# Patient Record
Sex: MALE | Race: WHITE | NOT HISPANIC OR LATINO | URBAN - METROPOLITAN AREA
[De-identification: names, ages, dates, MRNs, and addresses within clinical notes are randomized per-mention and may not be internally consistent; named-entity substitution may affect disease eponyms.]

---

## 2018-01-01 ENCOUNTER — INPATIENT (INPATIENT)
Facility: HOSPITAL | Age: 83
LOS: 4 days | End: 2018-11-03
Attending: HOSPITALIST | Admitting: HOSPITALIST

## 2018-01-01 VITALS — SYSTOLIC BLOOD PRESSURE: 59 MMHG | RESPIRATION RATE: 14 BRPM | DIASTOLIC BLOOD PRESSURE: 30 MMHG | HEART RATE: 100 BPM

## 2018-01-01 VITALS
RESPIRATION RATE: 18 BRPM | OXYGEN SATURATION: 96 % | HEART RATE: 118 BPM | SYSTOLIC BLOOD PRESSURE: 115 MMHG | DIASTOLIC BLOOD PRESSURE: 68 MMHG | TEMPERATURE: 98 F

## 2018-01-01 DIAGNOSIS — R00.0 TACHYCARDIA, UNSPECIFIED: ICD-10-CM

## 2018-01-01 DIAGNOSIS — R64 CACHEXIA: ICD-10-CM

## 2018-01-01 DIAGNOSIS — Z87.891 PERSONAL HISTORY OF NICOTINE DEPENDENCE: ICD-10-CM

## 2018-01-01 DIAGNOSIS — E87.5 HYPERKALEMIA: ICD-10-CM

## 2018-01-01 DIAGNOSIS — N17.9 ACUTE KIDNEY FAILURE, UNSPECIFIED: ICD-10-CM

## 2018-01-01 DIAGNOSIS — Z79.84 LONG TERM (CURRENT) USE OF ORAL HYPOGLYCEMIC DRUGS: ICD-10-CM

## 2018-01-01 DIAGNOSIS — C78.6 SECONDARY MALIGNANT NEOPLASM OF RETROPERITONEUM AND PERITONEUM: ICD-10-CM

## 2018-01-01 DIAGNOSIS — Z82.49 FAMILY HISTORY OF ISCHEMIC HEART DISEASE AND OTHER DISEASES OF THE CIRCULATORY SYSTEM: ICD-10-CM

## 2018-01-01 DIAGNOSIS — I12.9 HYPERTENSIVE CHRONIC KIDNEY DISEASE WITH STAGE 1 THROUGH STAGE 4 CHRONIC KIDNEY DISEASE, OR UNSPECIFIED CHRONIC KIDNEY DISEASE: ICD-10-CM

## 2018-01-01 DIAGNOSIS — E78.5 HYPERLIPIDEMIA, UNSPECIFIED: ICD-10-CM

## 2018-01-01 DIAGNOSIS — E87.2 ACIDOSIS: ICD-10-CM

## 2018-01-01 DIAGNOSIS — E11.22 TYPE 2 DIABETES MELLITUS WITH DIABETIC CHRONIC KIDNEY DISEASE: ICD-10-CM

## 2018-01-01 DIAGNOSIS — K74.60 UNSPECIFIED CIRRHOSIS OF LIVER: ICD-10-CM

## 2018-01-01 DIAGNOSIS — N18.3 CHRONIC KIDNEY DISEASE, STAGE 3 (MODERATE): ICD-10-CM

## 2018-01-01 DIAGNOSIS — Z98.890 OTHER SPECIFIED POSTPROCEDURAL STATES: Chronic | ICD-10-CM

## 2018-01-01 DIAGNOSIS — I95.81 POSTPROCEDURAL HYPOTENSION: ICD-10-CM

## 2018-01-01 DIAGNOSIS — Z51.5 ENCOUNTER FOR PALLIATIVE CARE: ICD-10-CM

## 2018-01-01 DIAGNOSIS — C80.1 MALIGNANT (PRIMARY) NEOPLASM, UNSPECIFIED: ICD-10-CM

## 2018-01-01 DIAGNOSIS — H40.9 UNSPECIFIED GLAUCOMA: ICD-10-CM

## 2018-01-01 DIAGNOSIS — R91.1 SOLITARY PULMONARY NODULE: ICD-10-CM

## 2018-01-01 DIAGNOSIS — K76.9 LIVER DISEASE, UNSPECIFIED: ICD-10-CM

## 2018-01-01 DIAGNOSIS — R18.8 OTHER ASCITES: ICD-10-CM

## 2018-01-01 DIAGNOSIS — R53.1 WEAKNESS: ICD-10-CM

## 2018-01-01 DIAGNOSIS — R19.7 DIARRHEA, UNSPECIFIED: ICD-10-CM

## 2018-01-01 DIAGNOSIS — Z83.3 FAMILY HISTORY OF DIABETES MELLITUS: ICD-10-CM

## 2018-01-01 DIAGNOSIS — Z66 DO NOT RESUSCITATE: ICD-10-CM

## 2018-01-01 DIAGNOSIS — N40.0 BENIGN PROSTATIC HYPERPLASIA WITHOUT LOWER URINARY TRACT SYMPTOMS: ICD-10-CM

## 2018-01-01 LAB
ALBUMIN FLD-MCNC: 1.6 G/DL — SIGNIFICANT CHANGE UP
ALBUMIN SERPL ELPH-MCNC: 2.2 G/DL — LOW (ref 3.5–5.2)
ALBUMIN SERPL ELPH-MCNC: 2.3 G/DL — LOW (ref 3.5–5.2)
ALBUMIN SERPL ELPH-MCNC: 2.3 G/DL — LOW (ref 3.5–5.2)
ALBUMIN SERPL ELPH-MCNC: 2.4 G/DL — LOW (ref 3.5–5.2)
ALP SERPL-CCNC: 84 U/L — SIGNIFICANT CHANGE UP (ref 30–115)
ALP SERPL-CCNC: 88 U/L — SIGNIFICANT CHANGE UP (ref 30–115)
ALP SERPL-CCNC: 90 U/L — SIGNIFICANT CHANGE UP (ref 30–115)
ALP SERPL-CCNC: 96 U/L — SIGNIFICANT CHANGE UP (ref 30–115)
ALT FLD-CCNC: 24 U/L — SIGNIFICANT CHANGE UP (ref 0–41)
ALT FLD-CCNC: 27 U/L — SIGNIFICANT CHANGE UP (ref 0–41)
ALT FLD-CCNC: 28 U/L — SIGNIFICANT CHANGE UP (ref 0–41)
ALT FLD-CCNC: 29 U/L — SIGNIFICANT CHANGE UP (ref 0–41)
ANION GAP SERPL CALC-SCNC: 19 MMOL/L — HIGH (ref 7–14)
ANION GAP SERPL CALC-SCNC: 20 MMOL/L — HIGH (ref 7–14)
ANION GAP SERPL CALC-SCNC: 21 MMOL/L — HIGH (ref 7–14)
ANION GAP SERPL CALC-SCNC: 21 MMOL/L — HIGH (ref 7–14)
ANION GAP SERPL CALC-SCNC: 23 MMOL/L — HIGH (ref 7–14)
ANION GAP SERPL CALC-SCNC: 24 MMOL/L — HIGH (ref 7–14)
APPEARANCE UR: CLEAR — SIGNIFICANT CHANGE UP
APTT BLD: 26.1 SEC — LOW (ref 27–39.2)
APTT BLD: 29.5 SEC — SIGNIFICANT CHANGE UP (ref 27–39.2)
AST SERPL-CCNC: 13 U/L — SIGNIFICANT CHANGE UP (ref 0–41)
AST SERPL-CCNC: 13 U/L — SIGNIFICANT CHANGE UP (ref 0–41)
AST SERPL-CCNC: 14 U/L — SIGNIFICANT CHANGE UP (ref 0–41)
AST SERPL-CCNC: 16 U/L — SIGNIFICANT CHANGE UP (ref 0–41)
B PERT IGG+IGM PNL SER: ABNORMAL
BACTERIA # UR AUTO: ABNORMAL /HPF
BASE EXCESS BLDV CALC-SCNC: -9.1 MMOL/L — LOW (ref -2–2)
BASOPHILS # BLD AUTO: 0 K/UL — SIGNIFICANT CHANGE UP (ref 0–0.2)
BASOPHILS # BLD AUTO: 0.05 K/UL — SIGNIFICANT CHANGE UP (ref 0–0.2)
BASOPHILS # BLD AUTO: 0.06 K/UL — SIGNIFICANT CHANGE UP (ref 0–0.2)
BASOPHILS # BLD AUTO: 0.07 K/UL — SIGNIFICANT CHANGE UP (ref 0–0.2)
BASOPHILS NFR BLD AUTO: 0 % — SIGNIFICANT CHANGE UP (ref 0–1)
BASOPHILS NFR BLD AUTO: 0.1 % — SIGNIFICANT CHANGE UP (ref 0–1)
BASOPHILS NFR BLD AUTO: 0.2 % — SIGNIFICANT CHANGE UP (ref 0–1)
BASOPHILS NFR BLD AUTO: 0.2 % — SIGNIFICANT CHANGE UP (ref 0–1)
BILIRUB DIRECT SERPL-MCNC: 0.2 MG/DL — SIGNIFICANT CHANGE UP (ref 0–0.2)
BILIRUB DIRECT SERPL-MCNC: 0.2 MG/DL — SIGNIFICANT CHANGE UP (ref 0–0.2)
BILIRUB DIRECT SERPL-MCNC: <0.2 MG/DL — SIGNIFICANT CHANGE UP (ref 0–0.2)
BILIRUB INDIRECT FLD-MCNC: 0.2 MG/DL — SIGNIFICANT CHANGE UP (ref 0.2–1.2)
BILIRUB INDIRECT FLD-MCNC: 0.2 MG/DL — SIGNIFICANT CHANGE UP (ref 0.2–1.2)
BILIRUB INDIRECT FLD-MCNC: >0.1 MG/DL — LOW (ref 0.2–1.2)
BILIRUB SERPL-MCNC: 0.3 MG/DL — SIGNIFICANT CHANGE UP (ref 0.2–1.2)
BILIRUB SERPL-MCNC: 0.4 MG/DL — SIGNIFICANT CHANGE UP (ref 0.2–1.2)
BILIRUB UR-MCNC: NEGATIVE — SIGNIFICANT CHANGE UP
BUN SERPL-MCNC: 141 MG/DL — CRITICAL HIGH (ref 10–20)
BUN SERPL-MCNC: 146 MG/DL — CRITICAL HIGH (ref 10–20)
BUN SERPL-MCNC: 146 MG/DL — CRITICAL HIGH (ref 10–20)
BUN SERPL-MCNC: 148 MG/DL — CRITICAL HIGH (ref 10–20)
BUN SERPL-MCNC: 149 MG/DL — CRITICAL HIGH (ref 10–20)
BUN SERPL-MCNC: 149 MG/DL — CRITICAL HIGH (ref 10–20)
BUN SERPL-MCNC: 150 MG/DL — CRITICAL HIGH (ref 10–20)
BUN SERPL-MCNC: 151 MG/DL — CRITICAL HIGH (ref 10–20)
BUN SERPL-MCNC: 163 MG/DL — CRITICAL HIGH (ref 10–20)
CA-I SERPL-SCNC: 1.22 MMOL/L — SIGNIFICANT CHANGE UP (ref 1.12–1.3)
CALCIUM SERPL-MCNC: 8.4 MG/DL — LOW (ref 8.5–10.1)
CALCIUM SERPL-MCNC: 8.6 MG/DL — SIGNIFICANT CHANGE UP (ref 8.5–10.1)
CALCIUM SERPL-MCNC: 8.6 MG/DL — SIGNIFICANT CHANGE UP (ref 8.5–10.1)
CALCIUM SERPL-MCNC: 8.7 MG/DL — SIGNIFICANT CHANGE UP (ref 8.5–10.1)
CALCIUM SERPL-MCNC: 8.8 MG/DL — SIGNIFICANT CHANGE UP (ref 8.5–10.1)
CALCIUM SERPL-MCNC: 8.8 MG/DL — SIGNIFICANT CHANGE UP (ref 8.5–10.1)
CALCIUM SERPL-MCNC: 8.9 MG/DL — SIGNIFICANT CHANGE UP (ref 8.5–10.1)
CALCIUM SERPL-MCNC: 9.1 MG/DL — SIGNIFICANT CHANGE UP (ref 8.5–10.1)
CALCIUM SERPL-MCNC: 9.4 MG/DL — SIGNIFICANT CHANGE UP (ref 8.5–10.1)
CANCER AG19-9 SERPL-ACNC: 211.5 U/ML — HIGH
CEA SERPL-MCNC: 5.5 NG/ML — HIGH (ref 0–3.8)
CHLORIDE SERPL-SCNC: 100 MMOL/L — SIGNIFICANT CHANGE UP (ref 98–110)
CHLORIDE SERPL-SCNC: 100 MMOL/L — SIGNIFICANT CHANGE UP (ref 98–110)
CHLORIDE SERPL-SCNC: 101 MMOL/L — SIGNIFICANT CHANGE UP (ref 98–110)
CHLORIDE SERPL-SCNC: 102 MMOL/L — SIGNIFICANT CHANGE UP (ref 98–110)
CHLORIDE SERPL-SCNC: 95 MMOL/L — LOW (ref 98–110)
CHLORIDE SERPL-SCNC: 96 MMOL/L — LOW (ref 98–110)
CHLORIDE SERPL-SCNC: 99 MMOL/L — SIGNIFICANT CHANGE UP (ref 98–110)
CK MB CFR SERPL CALC: 6.6 NG/ML — HIGH (ref 0.6–6.3)
CK SERPL-CCNC: 131 U/L — SIGNIFICANT CHANGE UP (ref 0–225)
CO2 SERPL-SCNC: 15 MMOL/L — LOW (ref 17–32)
CO2 SERPL-SCNC: 15 MMOL/L — LOW (ref 17–32)
CO2 SERPL-SCNC: 16 MMOL/L — LOW (ref 17–32)
CO2 SERPL-SCNC: 17 MMOL/L — SIGNIFICANT CHANGE UP (ref 17–32)
CO2 SERPL-SCNC: 18 MMOL/L — SIGNIFICANT CHANGE UP (ref 17–32)
CO2 SERPL-SCNC: 18 MMOL/L — SIGNIFICANT CHANGE UP (ref 17–32)
CO2 SERPL-SCNC: 19 MMOL/L — SIGNIFICANT CHANGE UP (ref 17–32)
CO2 SERPL-SCNC: 21 MMOL/L — SIGNIFICANT CHANGE UP (ref 17–32)
CO2 SERPL-SCNC: 21 MMOL/L — SIGNIFICANT CHANGE UP (ref 17–32)
COLOR FLD: SIGNIFICANT CHANGE UP
COLOR SPEC: YELLOW — SIGNIFICANT CHANGE UP
CREAT SERPL-MCNC: 4.3 MG/DL — CRITICAL HIGH (ref 0.7–1.5)
CREAT SERPL-MCNC: 4.7 MG/DL — CRITICAL HIGH (ref 0.7–1.5)
CREAT SERPL-MCNC: 5 MG/DL — CRITICAL HIGH (ref 0.7–1.5)
CREAT SERPL-MCNC: 5.1 MG/DL — CRITICAL HIGH (ref 0.7–1.5)
CREAT SERPL-MCNC: 5.2 MG/DL — CRITICAL HIGH (ref 0.7–1.5)
CREAT SERPL-MCNC: 5.5 MG/DL — CRITICAL HIGH (ref 0.7–1.5)
CREAT SERPL-MCNC: 5.7 MG/DL — CRITICAL HIGH (ref 0.7–1.5)
CREAT SERPL-MCNC: 6.1 MG/DL — CRITICAL HIGH (ref 0.7–1.5)
CREAT SERPL-MCNC: 6.6 MG/DL — CRITICAL HIGH (ref 0.7–1.5)
DIFF PNL FLD: ABNORMAL
EOSINOPHIL # BLD AUTO: 0.07 K/UL — SIGNIFICANT CHANGE UP (ref 0–0.7)
EOSINOPHIL # BLD AUTO: 0.23 K/UL — SIGNIFICANT CHANGE UP (ref 0–0.7)
EOSINOPHIL # BLD AUTO: 0.24 K/UL — SIGNIFICANT CHANGE UP (ref 0–0.7)
EOSINOPHIL # BLD AUTO: 0.24 K/UL — SIGNIFICANT CHANGE UP (ref 0–0.7)
EOSINOPHIL NFR BLD AUTO: 0.2 % — SIGNIFICANT CHANGE UP (ref 0–8)
EOSINOPHIL NFR BLD AUTO: 0.6 % — SIGNIFICANT CHANGE UP (ref 0–8)
EOSINOPHIL NFR BLD AUTO: 0.7 % — SIGNIFICANT CHANGE UP (ref 0–8)
EOSINOPHIL NFR BLD AUTO: 0.9 % — SIGNIFICANT CHANGE UP (ref 0–8)
ESTIMATED AVERAGE GLUCOSE: 157 MG/DL — HIGH (ref 68–114)
FLUID INTAKE SUBSTANCE CLASS: SIGNIFICANT CHANGE UP
FLUID SEGMENTED GRANULOCYTES: SIGNIFICANT CHANGE UP %
GAS PNL BLDV: 135 MMOL/L — LOW (ref 136–145)
GAS PNL BLDV: SIGNIFICANT CHANGE UP
GIANT PLATELETS BLD QL SMEAR: PRESENT — SIGNIFICANT CHANGE UP
GLUCOSE BLDC GLUCOMTR-MCNC: 102 MG/DL — HIGH (ref 70–99)
GLUCOSE BLDC GLUCOMTR-MCNC: 110 MG/DL — HIGH (ref 70–99)
GLUCOSE BLDC GLUCOMTR-MCNC: 114 MG/DL — HIGH (ref 70–99)
GLUCOSE BLDC GLUCOMTR-MCNC: 120 MG/DL — HIGH (ref 70–99)
GLUCOSE BLDC GLUCOMTR-MCNC: 122 MG/DL — HIGH (ref 70–99)
GLUCOSE BLDC GLUCOMTR-MCNC: 133 MG/DL — HIGH (ref 70–99)
GLUCOSE BLDC GLUCOMTR-MCNC: 147 MG/DL — HIGH (ref 70–99)
GLUCOSE BLDC GLUCOMTR-MCNC: 148 MG/DL — HIGH (ref 70–99)
GLUCOSE BLDC GLUCOMTR-MCNC: 158 MG/DL — HIGH (ref 70–99)
GLUCOSE BLDC GLUCOMTR-MCNC: 164 MG/DL — HIGH (ref 70–99)
GLUCOSE BLDC GLUCOMTR-MCNC: 166 MG/DL — HIGH (ref 70–99)
GLUCOSE BLDC GLUCOMTR-MCNC: 168 MG/DL — HIGH (ref 70–99)
GLUCOSE BLDC GLUCOMTR-MCNC: 170 MG/DL — HIGH (ref 70–99)
GLUCOSE BLDC GLUCOMTR-MCNC: 172 MG/DL — HIGH (ref 70–99)
GLUCOSE BLDC GLUCOMTR-MCNC: 190 MG/DL — HIGH (ref 70–99)
GLUCOSE BLDC GLUCOMTR-MCNC: 199 MG/DL — HIGH (ref 70–99)
GLUCOSE BLDC GLUCOMTR-MCNC: 216 MG/DL — HIGH (ref 70–99)
GLUCOSE BLDC GLUCOMTR-MCNC: 242 MG/DL — HIGH (ref 70–99)
GLUCOSE BLDC GLUCOMTR-MCNC: 272 MG/DL — HIGH (ref 70–99)
GLUCOSE BLDC GLUCOMTR-MCNC: 57 MG/DL — LOW (ref 70–99)
GLUCOSE BLDC GLUCOMTR-MCNC: 58 MG/DL — LOW (ref 70–99)
GLUCOSE BLDC GLUCOMTR-MCNC: 63 MG/DL — LOW (ref 70–99)
GLUCOSE BLDC GLUCOMTR-MCNC: 65 MG/DL — LOW (ref 70–99)
GLUCOSE BLDC GLUCOMTR-MCNC: 72 MG/DL — SIGNIFICANT CHANGE UP (ref 70–99)
GLUCOSE BLDC GLUCOMTR-MCNC: 85 MG/DL — SIGNIFICANT CHANGE UP (ref 70–99)
GLUCOSE BLDC GLUCOMTR-MCNC: 93 MG/DL — SIGNIFICANT CHANGE UP (ref 70–99)
GLUCOSE SERPL-MCNC: 136 MG/DL — HIGH (ref 70–99)
GLUCOSE SERPL-MCNC: 148 MG/DL — HIGH (ref 70–99)
GLUCOSE SERPL-MCNC: 151 MG/DL — HIGH (ref 70–99)
GLUCOSE SERPL-MCNC: 172 MG/DL — HIGH (ref 70–99)
GLUCOSE SERPL-MCNC: 204 MG/DL — HIGH (ref 70–99)
GLUCOSE SERPL-MCNC: 207 MG/DL — HIGH (ref 70–99)
GLUCOSE SERPL-MCNC: 290 MG/DL — HIGH (ref 70–99)
GLUCOSE SERPL-MCNC: 352 MG/DL — HIGH (ref 70–99)
GLUCOSE SERPL-MCNC: 82 MG/DL — SIGNIFICANT CHANGE UP (ref 70–99)
GLUCOSE UR QL: NEGATIVE MG/DL — SIGNIFICANT CHANGE UP
GRAM STN FLD: SIGNIFICANT CHANGE UP
HAV IGG SER QL IA: REACTIVE
HAV IGM SER-ACNC: SIGNIFICANT CHANGE UP
HBA1C BLD-MCNC: 7.1 % — HIGH (ref 4–5.6)
HBV CORE IGM SER-ACNC: SIGNIFICANT CHANGE UP
HBV SURFACE AB SER-ACNC: SIGNIFICANT CHANGE UP
HBV SURFACE AG SER-ACNC: SIGNIFICANT CHANGE UP
HCO3 BLDV-SCNC: 17 MMOL/L — LOW (ref 22–29)
HCT VFR BLD CALC: 30.9 % — LOW (ref 42–52)
HCT VFR BLD CALC: 31.3 % — LOW (ref 42–52)
HCT VFR BLD CALC: 32.6 % — LOW (ref 42–52)
HCT VFR BLD CALC: 33.5 % — LOW (ref 42–52)
HCT VFR BLD CALC: 34.2 % — LOW (ref 42–52)
HCT VFR BLDA CALC: 38 % — SIGNIFICANT CHANGE UP (ref 34–44)
HCV AB S/CO SERPL IA: 0.09 S/CO — SIGNIFICANT CHANGE UP
HCV AB SERPL-IMP: SIGNIFICANT CHANGE UP
HGB BLD CALC-MCNC: 12.4 G/DL — LOW (ref 14–18)
HGB BLD-MCNC: 10.3 G/DL — LOW (ref 14–18)
HGB BLD-MCNC: 10.3 G/DL — LOW (ref 14–18)
HGB BLD-MCNC: 10.8 G/DL — LOW (ref 14–18)
HGB BLD-MCNC: 11.2 G/DL — LOW (ref 14–18)
HGB BLD-MCNC: 11.4 G/DL — LOW (ref 14–18)
IMM GRANULOCYTES NFR BLD AUTO: 2.8 % — HIGH (ref 0.1–0.3)
IMM GRANULOCYTES NFR BLD AUTO: 3.1 % — HIGH (ref 0.1–0.3)
IMM GRANULOCYTES NFR BLD AUTO: 4.1 % — HIGH (ref 0.1–0.3)
INR BLD: 1.24 RATIO — SIGNIFICANT CHANGE UP (ref 0.65–1.3)
INR BLD: 1.36 RATIO — HIGH (ref 0.65–1.3)
KETONES UR-MCNC: NEGATIVE — SIGNIFICANT CHANGE UP
LACTATE BLDV-MCNC: 2.6 MMOL/L — HIGH (ref 0.5–1.6)
LACTATE SERPL-SCNC: 3 MMOL/L — HIGH (ref 0.5–2.2)
LDH SERPL L TO P-CCNC: 270 U/L — SIGNIFICANT CHANGE UP
LEUKOCYTE ESTERASE UR-ACNC: ABNORMAL
LIDOCAIN IGE QN: 28 U/L — SIGNIFICANT CHANGE UP (ref 7–60)
LYMPHOCYTES # BLD AUTO: 0 % — LOW (ref 20.5–51.1)
LYMPHOCYTES # BLD AUTO: 0 K/UL — LOW (ref 1.2–3.4)
LYMPHOCYTES # BLD AUTO: 0.2 K/UL — LOW (ref 1.2–3.4)
LYMPHOCYTES # BLD AUTO: 0.32 K/UL — LOW (ref 1.2–3.4)
LYMPHOCYTES # BLD AUTO: 0.34 K/UL — LOW (ref 1.2–3.4)
LYMPHOCYTES # BLD AUTO: 0.7 % — LOW (ref 20.5–51.1)
LYMPHOCYTES # BLD AUTO: 0.9 % — LOW (ref 20.5–51.1)
LYMPHOCYTES # BLD AUTO: 1 % — LOW (ref 20.5–51.1)
MAGNESIUM SERPL-MCNC: 2.5 MG/DL — HIGH (ref 1.8–2.4)
MAGNESIUM SERPL-MCNC: 2.7 MG/DL — HIGH (ref 1.8–2.4)
MAGNESIUM SERPL-MCNC: 2.9 MG/DL — HIGH (ref 1.8–2.4)
MANUAL SMEAR VERIFICATION: SIGNIFICANT CHANGE UP
MCHC RBC-ENTMCNC: 29.5 PG — SIGNIFICANT CHANGE UP (ref 27–31)
MCHC RBC-ENTMCNC: 29.5 PG — SIGNIFICANT CHANGE UP (ref 27–31)
MCHC RBC-ENTMCNC: 29.6 PG — SIGNIFICANT CHANGE UP (ref 27–31)
MCHC RBC-ENTMCNC: 32.9 G/DL — SIGNIFICANT CHANGE UP (ref 32–37)
MCHC RBC-ENTMCNC: 33.1 G/DL — SIGNIFICANT CHANGE UP (ref 32–37)
MCHC RBC-ENTMCNC: 33.3 G/DL — SIGNIFICANT CHANGE UP (ref 32–37)
MCHC RBC-ENTMCNC: 33.3 G/DL — SIGNIFICANT CHANGE UP (ref 32–37)
MCHC RBC-ENTMCNC: 33.4 G/DL — SIGNIFICANT CHANGE UP (ref 32–37)
MCV RBC AUTO: 88.5 FL — SIGNIFICANT CHANGE UP (ref 80–94)
MCV RBC AUTO: 88.6 FL — SIGNIFICANT CHANGE UP (ref 80–94)
MCV RBC AUTO: 88.8 FL — SIGNIFICANT CHANGE UP (ref 80–94)
MCV RBC AUTO: 89.1 FL — SIGNIFICANT CHANGE UP (ref 80–94)
MCV RBC AUTO: 89.9 FL — SIGNIFICANT CHANGE UP (ref 80–94)
MESOTHL CELL # FLD: PRESENT % — SIGNIFICANT CHANGE UP
MONOCYTES # BLD AUTO: 0.92 K/UL — HIGH (ref 0.1–0.6)
MONOCYTES # BLD AUTO: 1.15 K/UL — HIGH (ref 0.1–0.6)
MONOCYTES # BLD AUTO: 1.24 K/UL — HIGH (ref 0.1–0.6)
MONOCYTES # BLD AUTO: 1.67 K/UL — HIGH (ref 0.1–0.6)
MONOCYTES NFR BLD AUTO: 3.3 % — SIGNIFICANT CHANGE UP (ref 1.7–9.3)
MONOCYTES NFR BLD AUTO: 3.4 % — SIGNIFICANT CHANGE UP (ref 1.7–9.3)
MONOCYTES NFR BLD AUTO: 4.2 % — SIGNIFICANT CHANGE UP (ref 1.7–9.3)
MONOCYTES NFR BLD AUTO: 4.5 % — SIGNIFICANT CHANGE UP (ref 1.7–9.3)
MONOS+MACROS # FLD: PRESENT % — SIGNIFICANT CHANGE UP
NEUTROPHILS # BLD AUTO: 25.53 K/UL — HIGH (ref 1.4–6.5)
NEUTROPHILS # BLD AUTO: 26.54 K/UL — HIGH (ref 1.4–6.5)
NEUTROPHILS # BLD AUTO: 31.76 K/UL — HIGH (ref 1.4–6.5)
NEUTROPHILS # BLD AUTO: 33.7 K/UL — HIGH (ref 1.4–6.5)
NEUTROPHILS NFR BLD AUTO: 78.4 % — HIGH (ref 42.2–75.2)
NEUTROPHILS NFR BLD AUTO: 90.6 % — HIGH (ref 42.2–75.2)
NEUTROPHILS NFR BLD AUTO: 90.7 % — HIGH (ref 42.2–75.2)
NEUTROPHILS NFR BLD AUTO: 92.1 % — HIGH (ref 42.2–75.2)
NEUTS BAND # BLD: 16.4 % — HIGH (ref 0–6)
NITRITE UR-MCNC: NEGATIVE — SIGNIFICANT CHANGE UP
NON-GYNECOLOGICAL CYTOLOGY STUDY: SIGNIFICANT CHANGE UP
NRBC # BLD: 0 /100 WBCS — SIGNIFICANT CHANGE UP (ref 0–0)
PCO2 BLDV: 37 MMHG — LOW (ref 41–51)
PH BLDV: 7.27 — SIGNIFICANT CHANGE UP (ref 7.26–7.43)
PH UR: 5.5 — SIGNIFICANT CHANGE UP (ref 5–8)
PHOSPHATE SERPL-MCNC: 6.1 MG/DL — HIGH (ref 2.1–4.9)
PHOSPHATE SERPL-MCNC: 6.4 MG/DL — HIGH (ref 2.1–4.9)
PHOSPHATE SERPL-MCNC: 6.8 MG/DL — HIGH (ref 2.1–4.9)
PLAT MORPH BLD: NORMAL — SIGNIFICANT CHANGE UP
PLATELET # BLD AUTO: 348 K/UL — SIGNIFICANT CHANGE UP (ref 130–400)
PLATELET # BLD AUTO: 382 K/UL — SIGNIFICANT CHANGE UP (ref 130–400)
PLATELET # BLD AUTO: 420 K/UL — HIGH (ref 130–400)
PLATELET # BLD AUTO: 422 K/UL — HIGH (ref 130–400)
PLATELET # BLD AUTO: 469 K/UL — HIGH (ref 130–400)
PO2 BLDV: 22 MMHG — SIGNIFICANT CHANGE UP (ref 20–40)
POIKILOCYTOSIS BLD QL AUTO: SIGNIFICANT CHANGE UP
POTASSIUM BLDV-SCNC: 5.6 MMOL/L — SIGNIFICANT CHANGE UP (ref 3.3–5.6)
POTASSIUM SERPL-MCNC: 4.2 MMOL/L — SIGNIFICANT CHANGE UP (ref 3.5–5)
POTASSIUM SERPL-MCNC: 4.5 MMOL/L — SIGNIFICANT CHANGE UP (ref 3.5–5)
POTASSIUM SERPL-MCNC: 4.8 MMOL/L — SIGNIFICANT CHANGE UP (ref 3.5–5)
POTASSIUM SERPL-MCNC: 5.1 MMOL/L — HIGH (ref 3.5–5)
POTASSIUM SERPL-MCNC: 5.1 MMOL/L — HIGH (ref 3.5–5)
POTASSIUM SERPL-MCNC: 5.3 MMOL/L — HIGH (ref 3.5–5)
POTASSIUM SERPL-MCNC: 5.4 MMOL/L — HIGH (ref 3.5–5)
POTASSIUM SERPL-MCNC: 6.3 MMOL/L — CRITICAL HIGH (ref 3.5–5)
POTASSIUM SERPL-MCNC: 6.5 MMOL/L — CRITICAL HIGH (ref 3.5–5)
POTASSIUM SERPL-SCNC: 4.2 MMOL/L — SIGNIFICANT CHANGE UP (ref 3.5–5)
POTASSIUM SERPL-SCNC: 4.5 MMOL/L — SIGNIFICANT CHANGE UP (ref 3.5–5)
POTASSIUM SERPL-SCNC: 4.8 MMOL/L — SIGNIFICANT CHANGE UP (ref 3.5–5)
POTASSIUM SERPL-SCNC: 5.1 MMOL/L — HIGH (ref 3.5–5)
POTASSIUM SERPL-SCNC: 5.1 MMOL/L — HIGH (ref 3.5–5)
POTASSIUM SERPL-SCNC: 5.3 MMOL/L — HIGH (ref 3.5–5)
POTASSIUM SERPL-SCNC: 5.4 MMOL/L — HIGH (ref 3.5–5)
POTASSIUM SERPL-SCNC: 6.3 MMOL/L — CRITICAL HIGH (ref 3.5–5)
POTASSIUM SERPL-SCNC: 6.5 MMOL/L — CRITICAL HIGH (ref 3.5–5)
PROMYELOCYTES # FLD: 0.9 % — HIGH (ref 0–0)
PROT SERPL-MCNC: 4.9 G/DL — LOW (ref 6–8)
PROT SERPL-MCNC: 5.5 G/DL — LOW (ref 6–8)
PROT SERPL-MCNC: 5.6 G/DL — LOW (ref 6–8)
PROT SERPL-MCNC: 6 G/DL — SIGNIFICANT CHANGE UP (ref 6–8)
PROT UR-MCNC: ABNORMAL MG/DL
PROTHROM AB SERPL-ACNC: 14.2 SEC — HIGH (ref 9.95–12.87)
PROTHROM AB SERPL-ACNC: 15.6 SEC — HIGH (ref 9.95–12.87)
RBC # BLD: 3.48 M/UL — LOW (ref 4.7–6.1)
RBC # BLD: 3.49 M/UL — LOW (ref 4.7–6.1)
RBC # BLD: 3.66 M/UL — LOW (ref 4.7–6.1)
RBC # BLD: 3.78 M/UL — LOW (ref 4.7–6.1)
RBC # BLD: 3.85 M/UL — LOW (ref 4.7–6.1)
RBC # FLD: 15.4 % — HIGH (ref 11.5–14.5)
RBC # FLD: 15.4 % — HIGH (ref 11.5–14.5)
RBC # FLD: 15.5 % — HIGH (ref 11.5–14.5)
RBC # FLD: 15.6 % — HIGH (ref 11.5–14.5)
RBC # FLD: 16 % — HIGH (ref 11.5–14.5)
RBC BLD AUTO: NORMAL — SIGNIFICANT CHANGE UP
RBC CASTS # UR COMP ASSIST: SIGNIFICANT CHANGE UP /HPF
RCV VOL RI: HIGH /UL (ref 0–5)
SAO2 % BLDV: 30 % — SIGNIFICANT CHANGE UP
SODIUM SERPL-SCNC: 130 MMOL/L — LOW (ref 135–146)
SODIUM SERPL-SCNC: 131 MMOL/L — LOW (ref 135–146)
SODIUM SERPL-SCNC: 137 MMOL/L — SIGNIFICANT CHANGE UP (ref 135–146)
SODIUM SERPL-SCNC: 138 MMOL/L — SIGNIFICANT CHANGE UP (ref 135–146)
SODIUM SERPL-SCNC: 139 MMOL/L — SIGNIFICANT CHANGE UP (ref 135–146)
SODIUM SERPL-SCNC: 140 MMOL/L — SIGNIFICANT CHANGE UP (ref 135–146)
SODIUM SERPL-SCNC: 143 MMOL/L — SIGNIFICANT CHANGE UP (ref 135–146)
SODIUM SERPL-SCNC: 144 MMOL/L — SIGNIFICANT CHANGE UP (ref 135–146)
SODIUM SERPL-SCNC: 146 MMOL/L — SIGNIFICANT CHANGE UP (ref 135–146)
SP GR SPEC: 1.01 — SIGNIFICANT CHANGE UP (ref 1.01–1.03)
SPECIMEN SOURCE FLD: SIGNIFICANT CHANGE UP
SPECIMEN SOURCE: SIGNIFICANT CHANGE UP
TOTAL NUCLEATED CELL COUNT, BODY FLUID: 1470 /UL — HIGH (ref 0–5)
TROPONIN T SERPL-MCNC: 0.04 NG/ML — CRITICAL HIGH
TROPONIN T SERPL-MCNC: 0.05 NG/ML — CRITICAL HIGH
TUBE TYPE: SIGNIFICANT CHANGE UP
TYPE + AB SCN PNL BLD: SIGNIFICANT CHANGE UP
UROBILINOGEN FLD QL: 0.2 MG/DL — SIGNIFICANT CHANGE UP (ref 0.2–0.2)
WBC # BLD: 26.93 K/UL — HIGH (ref 4.8–10.8)
WBC # BLD: 29.3 K/UL — HIGH (ref 4.8–10.8)
WBC # BLD: 34.5 K/UL — HIGH (ref 4.8–10.8)
WBC # BLD: 34.91 K/UL — HIGH (ref 4.8–10.8)
WBC # BLD: 37.15 K/UL — HIGH (ref 4.8–10.8)
WBC # FLD AUTO: 26.93 K/UL — HIGH (ref 4.8–10.8)
WBC # FLD AUTO: 29.3 K/UL — HIGH (ref 4.8–10.8)
WBC # FLD AUTO: 34.5 K/UL — HIGH (ref 4.8–10.8)
WBC # FLD AUTO: 34.91 K/UL — HIGH (ref 4.8–10.8)
WBC # FLD AUTO: 37.15 K/UL — HIGH (ref 4.8–10.8)
WBC UR QL: ABNORMAL /HPF

## 2018-01-01 RX ORDER — INSULIN HUMAN 100 [IU]/ML
10 INJECTION, SOLUTION SUBCUTANEOUS ONCE
Qty: 0 | Refills: 0 | Status: COMPLETED | OUTPATIENT
Start: 2018-01-01 | End: 2018-01-01

## 2018-01-01 RX ORDER — DEXTROSE 50 % IN WATER 50 %
25 SYRINGE (ML) INTRAVENOUS ONCE
Qty: 0 | Refills: 0 | Status: DISCONTINUED | OUTPATIENT
Start: 2018-01-01 | End: 2018-01-01

## 2018-01-01 RX ORDER — DEXTROSE 50 % IN WATER 50 %
50 SYRINGE (ML) INTRAVENOUS ONCE
Qty: 0 | Refills: 0 | Status: COMPLETED | OUTPATIENT
Start: 2018-01-01 | End: 2018-01-01

## 2018-01-01 RX ORDER — LATANOPROST 0.05 MG/ML
1 SOLUTION/ DROPS OPHTHALMIC; TOPICAL AT BEDTIME
Qty: 0 | Refills: 0 | Status: DISCONTINUED | OUTPATIENT
Start: 2018-01-01 | End: 2018-01-01

## 2018-01-01 RX ORDER — GLIMEPIRIDE 1 MG
0 TABLET ORAL
Qty: 0 | Refills: 0 | COMMUNITY

## 2018-01-01 RX ORDER — MORPHINE SULFATE 50 MG/1
2 CAPSULE, EXTENDED RELEASE ORAL
Qty: 0 | Refills: 0 | Status: DISCONTINUED | OUTPATIENT
Start: 2018-01-01 | End: 2018-01-01

## 2018-01-01 RX ORDER — SODIUM CHLORIDE 9 MG/ML
1000 INJECTION INTRAMUSCULAR; INTRAVENOUS; SUBCUTANEOUS ONCE
Qty: 0 | Refills: 0 | Status: COMPLETED | OUTPATIENT
Start: 2018-01-01 | End: 2018-01-01

## 2018-01-01 RX ORDER — TAMSULOSIN HYDROCHLORIDE 0.4 MG/1
0.4 CAPSULE ORAL AT BEDTIME
Qty: 0 | Refills: 0 | Status: DISCONTINUED | OUTPATIENT
Start: 2018-01-01 | End: 2018-01-01

## 2018-01-01 RX ORDER — HEPARIN SODIUM 5000 [USP'U]/ML
5000 INJECTION INTRAVENOUS; SUBCUTANEOUS EVERY 8 HOURS
Qty: 0 | Refills: 0 | Status: DISCONTINUED | OUTPATIENT
Start: 2018-01-01 | End: 2018-01-01

## 2018-01-01 RX ORDER — SENNA PLUS 8.6 MG/1
2 TABLET ORAL AT BEDTIME
Qty: 0 | Refills: 0 | Status: DISCONTINUED | OUTPATIENT
Start: 2018-01-01 | End: 2018-01-01

## 2018-01-01 RX ORDER — SIMETHICONE 80 MG/1
80 TABLET, CHEWABLE ORAL DAILY
Qty: 0 | Refills: 0 | Status: DISCONTINUED | OUTPATIENT
Start: 2018-01-01 | End: 2018-01-01

## 2018-01-01 RX ORDER — DRONABINOL 2.5 MG
2.5 CAPSULE ORAL
Qty: 0 | Refills: 0 | Status: DISCONTINUED | OUTPATIENT
Start: 2018-01-01 | End: 2018-01-01

## 2018-01-01 RX ORDER — MORPHINE SULFATE 50 MG/1
2 CAPSULE, EXTENDED RELEASE ORAL EVERY 4 HOURS
Qty: 0 | Refills: 0 | Status: DISCONTINUED | OUTPATIENT
Start: 2018-01-01 | End: 2018-01-01

## 2018-01-01 RX ORDER — SODIUM BICARBONATE 1 MEQ/ML
0.16 SYRINGE (ML) INTRAVENOUS
Qty: 150 | Refills: 0 | Status: DISCONTINUED | OUTPATIENT
Start: 2018-01-01 | End: 2018-01-01

## 2018-01-01 RX ORDER — ALBUMIN HUMAN 25 %
500 VIAL (ML) INTRAVENOUS ONCE
Qty: 0 | Refills: 0 | Status: COMPLETED | OUTPATIENT
Start: 2018-01-01 | End: 2018-01-01

## 2018-01-01 RX ORDER — MORPHINE SULFATE 50 MG/1
2 CAPSULE, EXTENDED RELEASE ORAL EVERY 6 HOURS
Qty: 0 | Refills: 0 | Status: DISCONTINUED | OUTPATIENT
Start: 2018-01-01 | End: 2018-01-01

## 2018-01-01 RX ORDER — AZITHROMYCIN 500 MG/1
500 TABLET, FILM COATED ORAL ONCE
Qty: 0 | Refills: 0 | Status: COMPLETED | OUTPATIENT
Start: 2018-01-01 | End: 2018-01-01

## 2018-01-01 RX ORDER — AMPICILLIN SODIUM AND SULBACTAM SODIUM 250; 125 MG/ML; MG/ML
1.5 INJECTION, POWDER, FOR SUSPENSION INTRAMUSCULAR; INTRAVENOUS ONCE
Qty: 0 | Refills: 0 | Status: COMPLETED | OUTPATIENT
Start: 2018-01-01 | End: 2018-01-01

## 2018-01-01 RX ORDER — METOPROLOL TARTRATE 50 MG
25 TABLET ORAL
Qty: 0 | Refills: 0 | Status: DISCONTINUED | OUTPATIENT
Start: 2018-01-01 | End: 2018-01-01

## 2018-01-01 RX ORDER — DEXTROSE 50 % IN WATER 50 %
12.5 SYRINGE (ML) INTRAVENOUS ONCE
Qty: 0 | Refills: 0 | Status: DISCONTINUED | OUTPATIENT
Start: 2018-01-01 | End: 2018-01-01

## 2018-01-01 RX ORDER — ACETAMINOPHEN 500 MG
650 TABLET ORAL EVERY 6 HOURS
Qty: 0 | Refills: 0 | Status: DISCONTINUED | OUTPATIENT
Start: 2018-01-01 | End: 2018-01-01

## 2018-01-01 RX ORDER — INSULIN HUMAN 100 [IU]/ML
5 INJECTION, SOLUTION SUBCUTANEOUS ONCE
Qty: 0 | Refills: 0 | Status: COMPLETED | OUTPATIENT
Start: 2018-01-01 | End: 2018-01-01

## 2018-01-01 RX ORDER — CEFTRIAXONE 500 MG/1
1 INJECTION, POWDER, FOR SOLUTION INTRAMUSCULAR; INTRAVENOUS ONCE
Qty: 0 | Refills: 0 | Status: COMPLETED | OUTPATIENT
Start: 2018-01-01 | End: 2018-01-01

## 2018-01-01 RX ORDER — TAMSULOSIN HYDROCHLORIDE 0.4 MG/1
1 CAPSULE ORAL
Qty: 0 | Refills: 0 | COMMUNITY

## 2018-01-01 RX ORDER — SODIUM CHLORIDE 9 MG/ML
500 INJECTION, SOLUTION INTRAVENOUS ONCE
Qty: 0 | Refills: 0 | Status: COMPLETED | OUTPATIENT
Start: 2018-01-01 | End: 2018-01-01

## 2018-01-01 RX ORDER — INSULIN LISPRO 100/ML
4 VIAL (ML) SUBCUTANEOUS
Qty: 0 | Refills: 0 | Status: DISCONTINUED | OUTPATIENT
Start: 2018-01-01 | End: 2018-01-01

## 2018-01-01 RX ORDER — AMLODIPINE BESYLATE AND BENAZEPRIL HYDROCHLORIDE 10; 20 MG/1; MG/1
1 CAPSULE ORAL
Qty: 0 | Refills: 0 | COMMUNITY

## 2018-01-01 RX ORDER — LIDOCAINE HCL 20 MG/ML
20 VIAL (ML) INJECTION ONCE
Qty: 0 | Refills: 0 | Status: COMPLETED | OUTPATIENT
Start: 2018-01-01 | End: 2018-01-01

## 2018-01-01 RX ORDER — METRONIDAZOLE 500 MG
500 TABLET ORAL ONCE
Qty: 0 | Refills: 0 | Status: COMPLETED | OUTPATIENT
Start: 2018-01-01 | End: 2018-01-01

## 2018-01-01 RX ORDER — DEXTROSE 50 % IN WATER 50 %
15 SYRINGE (ML) INTRAVENOUS ONCE
Qty: 0 | Refills: 0 | Status: DISCONTINUED | OUTPATIENT
Start: 2018-01-01 | End: 2018-01-01

## 2018-01-01 RX ORDER — SITAGLIPTIN 50 MG/1
1 TABLET, FILM COATED ORAL
Qty: 0 | Refills: 0 | COMMUNITY

## 2018-01-01 RX ORDER — LATANOPROST 0.05 MG/ML
1 SOLUTION/ DROPS OPHTHALMIC; TOPICAL
Qty: 0 | Refills: 0 | COMMUNITY

## 2018-01-01 RX ORDER — AMPICILLIN SODIUM AND SULBACTAM SODIUM 250; 125 MG/ML; MG/ML
1.5 INJECTION, POWDER, FOR SUSPENSION INTRAMUSCULAR; INTRAVENOUS DAILY
Qty: 0 | Refills: 0 | Status: DISCONTINUED | OUTPATIENT
Start: 2018-01-01 | End: 2018-01-01

## 2018-01-01 RX ORDER — DOCUSATE SODIUM 100 MG
100 CAPSULE ORAL DAILY
Qty: 0 | Refills: 0 | Status: DISCONTINUED | OUTPATIENT
Start: 2018-01-01 | End: 2018-01-01

## 2018-01-01 RX ORDER — SODIUM BICARBONATE 1 MEQ/ML
500 SYRINGE (ML) INTRAVENOUS ONCE
Qty: 0 | Refills: 0 | Status: DISCONTINUED | OUTPATIENT
Start: 2018-01-01 | End: 2018-01-01

## 2018-01-01 RX ORDER — ATORVASTATIN CALCIUM 80 MG/1
1 TABLET, FILM COATED ORAL
Qty: 0 | Refills: 0 | COMMUNITY

## 2018-01-01 RX ORDER — ROBINUL 0.2 MG/ML
0.4 INJECTION INTRAMUSCULAR; INTRAVENOUS ONCE
Qty: 0 | Refills: 0 | Status: COMPLETED | OUTPATIENT
Start: 2018-01-01 | End: 2018-01-01

## 2018-01-01 RX ORDER — SODIUM BICARBONATE 1 MEQ/ML
0.11 SYRINGE (ML) INTRAVENOUS
Qty: 150 | Refills: 0 | Status: DISCONTINUED | OUTPATIENT
Start: 2018-01-01 | End: 2018-01-01

## 2018-01-01 RX ORDER — METOPROLOL TARTRATE 50 MG
1 TABLET ORAL
Qty: 0 | Refills: 0 | COMMUNITY

## 2018-01-01 RX ORDER — SODIUM POLYSTYRENE SULFONATE 4.1 MEQ/G
30 POWDER, FOR SUSPENSION ORAL ONCE
Qty: 0 | Refills: 0 | Status: COMPLETED | OUTPATIENT
Start: 2018-01-01 | End: 2018-01-01

## 2018-01-01 RX ORDER — DEXTROSE 50 % IN WATER 50 %
12.5 SYRINGE (ML) INTRAVENOUS ONCE
Qty: 0 | Refills: 0 | Status: COMPLETED | OUTPATIENT
Start: 2018-01-01 | End: 2018-01-01

## 2018-01-01 RX ORDER — INSULIN LISPRO 100/ML
VIAL (ML) SUBCUTANEOUS
Qty: 0 | Refills: 0 | Status: DISCONTINUED | OUTPATIENT
Start: 2018-01-01 | End: 2018-01-01

## 2018-01-01 RX ORDER — MIDODRINE HYDROCHLORIDE 2.5 MG/1
10 TABLET ORAL EVERY 8 HOURS
Qty: 0 | Refills: 0 | Status: DISCONTINUED | OUTPATIENT
Start: 2018-01-01 | End: 2018-01-01

## 2018-01-01 RX ORDER — CEFTRIAXONE 500 MG/1
1 INJECTION, POWDER, FOR SOLUTION INTRAMUSCULAR; INTRAVENOUS EVERY 24 HOURS
Qty: 0 | Refills: 0 | Status: DISCONTINUED | OUTPATIENT
Start: 2018-01-01 | End: 2018-01-01

## 2018-01-01 RX ORDER — SODIUM BICARBONATE 1 MEQ/ML
0.08 SYRINGE (ML) INTRAVENOUS
Qty: 75 | Refills: 0 | Status: DISCONTINUED | OUTPATIENT
Start: 2018-01-01 | End: 2018-01-01

## 2018-01-01 RX ORDER — AMPICILLIN SODIUM AND SULBACTAM SODIUM 250; 125 MG/ML; MG/ML
INJECTION, POWDER, FOR SUSPENSION INTRAMUSCULAR; INTRAVENOUS
Qty: 0 | Refills: 0 | Status: DISCONTINUED | OUTPATIENT
Start: 2018-01-01 | End: 2018-01-01

## 2018-01-01 RX ORDER — GLUCAGON INJECTION, SOLUTION 0.5 MG/.1ML
1 INJECTION, SOLUTION SUBCUTANEOUS ONCE
Qty: 0 | Refills: 0 | Status: DISCONTINUED | OUTPATIENT
Start: 2018-01-01 | End: 2018-01-01

## 2018-01-01 RX ORDER — INSULIN GLARGINE 100 [IU]/ML
10 INJECTION, SOLUTION SUBCUTANEOUS AT BEDTIME
Qty: 0 | Refills: 0 | Status: DISCONTINUED | OUTPATIENT
Start: 2018-01-01 | End: 2018-01-01

## 2018-01-01 RX ADMIN — INSULIN GLARGINE 10 UNIT(S): 100 INJECTION, SOLUTION SUBCUTANEOUS at 22:07

## 2018-01-01 RX ADMIN — MORPHINE SULFATE 2 MILLIGRAM(S): 50 CAPSULE, EXTENDED RELEASE ORAL at 18:51

## 2018-01-01 RX ADMIN — MORPHINE SULFATE 2 MILLIGRAM(S): 50 CAPSULE, EXTENDED RELEASE ORAL at 18:17

## 2018-01-01 RX ADMIN — Medication 20 MILLILITER(S): at 11:34

## 2018-01-01 RX ADMIN — AMPICILLIN SODIUM AND SULBACTAM SODIUM 100 GRAM(S): 250; 125 INJECTION, POWDER, FOR SUSPENSION INTRAMUSCULAR; INTRAVENOUS at 10:04

## 2018-01-01 RX ADMIN — HEPARIN SODIUM 5000 UNIT(S): 5000 INJECTION INTRAVENOUS; SUBCUTANEOUS at 21:41

## 2018-01-01 RX ADMIN — MIDODRINE HYDROCHLORIDE 10 MILLIGRAM(S): 2.5 TABLET ORAL at 06:00

## 2018-01-01 RX ADMIN — MORPHINE SULFATE 2 MILLIGRAM(S): 50 CAPSULE, EXTENDED RELEASE ORAL at 14:20

## 2018-01-01 RX ADMIN — Medication 2.5 MILLIGRAM(S): at 17:34

## 2018-01-01 RX ADMIN — Medication 25 MILLIGRAM(S): at 06:00

## 2018-01-01 RX ADMIN — MORPHINE SULFATE 2 MILLIGRAM(S): 50 CAPSULE, EXTENDED RELEASE ORAL at 19:19

## 2018-01-01 RX ADMIN — LATANOPROST 1 DROP(S): 0.05 SOLUTION/ DROPS OPHTHALMIC; TOPICAL at 22:32

## 2018-01-01 RX ADMIN — MORPHINE SULFATE 2 MILLIGRAM(S): 50 CAPSULE, EXTENDED RELEASE ORAL at 19:06

## 2018-01-01 RX ADMIN — Medication 50 MILLILITER(S): at 14:22

## 2018-01-01 RX ADMIN — SODIUM CHLORIDE 2000 MILLILITER(S): 9 INJECTION INTRAMUSCULAR; INTRAVENOUS; SUBCUTANEOUS at 12:59

## 2018-01-01 RX ADMIN — Medication 25 MILLIGRAM(S): at 17:32

## 2018-01-01 RX ADMIN — HEPARIN SODIUM 5000 UNIT(S): 5000 INJECTION INTRAVENOUS; SUBCUTANEOUS at 06:00

## 2018-01-01 RX ADMIN — MORPHINE SULFATE 2 MILLIGRAM(S): 50 CAPSULE, EXTENDED RELEASE ORAL at 20:33

## 2018-01-01 RX ADMIN — INSULIN GLARGINE 10 UNIT(S): 100 INJECTION, SOLUTION SUBCUTANEOUS at 22:26

## 2018-01-01 RX ADMIN — HEPARIN SODIUM 5000 UNIT(S): 5000 INJECTION INTRAVENOUS; SUBCUTANEOUS at 13:18

## 2018-01-01 RX ADMIN — Medication 100 MILLIGRAM(S): at 13:32

## 2018-01-01 RX ADMIN — MORPHINE SULFATE 2 MILLIGRAM(S): 50 CAPSULE, EXTENDED RELEASE ORAL at 15:27

## 2018-01-01 RX ADMIN — HEPARIN SODIUM 5000 UNIT(S): 5000 INJECTION INTRAVENOUS; SUBCUTANEOUS at 22:17

## 2018-01-01 RX ADMIN — AMPICILLIN SODIUM AND SULBACTAM SODIUM 100 GRAM(S): 250; 125 INJECTION, POWDER, FOR SUSPENSION INTRAMUSCULAR; INTRAVENOUS at 12:42

## 2018-01-01 RX ADMIN — MIDODRINE HYDROCHLORIDE 10 MILLIGRAM(S): 2.5 TABLET ORAL at 13:01

## 2018-01-01 RX ADMIN — Medication 100 MILLIGRAM(S): at 14:20

## 2018-01-01 RX ADMIN — SIMETHICONE 80 MILLIGRAM(S): 80 TABLET, CHEWABLE ORAL at 12:15

## 2018-01-01 RX ADMIN — Medication 650 MILLIGRAM(S): at 14:15

## 2018-01-01 RX ADMIN — SODIUM CHLORIDE 1000 MILLILITER(S): 9 INJECTION INTRAMUSCULAR; INTRAVENOUS; SUBCUTANEOUS at 13:32

## 2018-01-01 RX ADMIN — Medication 2.5 MILLIGRAM(S): at 06:02

## 2018-01-01 RX ADMIN — HEPARIN SODIUM 5000 UNIT(S): 5000 INJECTION INTRAVENOUS; SUBCUTANEOUS at 22:02

## 2018-01-01 RX ADMIN — HEPARIN SODIUM 5000 UNIT(S): 5000 INJECTION INTRAVENOUS; SUBCUTANEOUS at 06:28

## 2018-01-01 RX ADMIN — LATANOPROST 1 DROP(S): 0.05 SOLUTION/ DROPS OPHTHALMIC; TOPICAL at 22:01

## 2018-01-01 RX ADMIN — Medication 25 MILLIGRAM(S): at 06:29

## 2018-01-01 RX ADMIN — TAMSULOSIN HYDROCHLORIDE 0.4 MILLIGRAM(S): 0.4 CAPSULE ORAL at 22:32

## 2018-01-01 RX ADMIN — AMPICILLIN SODIUM AND SULBACTAM SODIUM 100 GRAM(S): 250; 125 INJECTION, POWDER, FOR SUSPENSION INTRAMUSCULAR; INTRAVENOUS at 12:14

## 2018-01-01 RX ADMIN — INSULIN GLARGINE 10 UNIT(S): 100 INJECTION, SOLUTION SUBCUTANEOUS at 21:40

## 2018-01-01 RX ADMIN — Medication 100 MILLIGRAM(S): at 17:31

## 2018-01-01 RX ADMIN — HEPARIN SODIUM 5000 UNIT(S): 5000 INJECTION INTRAVENOUS; SUBCUTANEOUS at 05:34

## 2018-01-01 RX ADMIN — SODIUM CHLORIDE 1000 MILLILITER(S): 9 INJECTION INTRAMUSCULAR; INTRAVENOUS; SUBCUTANEOUS at 15:30

## 2018-01-01 RX ADMIN — Medication 25 MILLIGRAM(S): at 19:39

## 2018-01-01 RX ADMIN — Medication 2.5 MILLIGRAM(S): at 06:29

## 2018-01-01 RX ADMIN — MORPHINE SULFATE 2 MILLIGRAM(S): 50 CAPSULE, EXTENDED RELEASE ORAL at 10:01

## 2018-01-01 RX ADMIN — Medication 75 MEQ/KG/HR: at 17:04

## 2018-01-01 RX ADMIN — Medication 25 MILLIGRAM(S): at 22:01

## 2018-01-01 RX ADMIN — MIDODRINE HYDROCHLORIDE 10 MILLIGRAM(S): 2.5 TABLET ORAL at 06:29

## 2018-01-01 RX ADMIN — HEPARIN SODIUM 5000 UNIT(S): 5000 INJECTION INTRAVENOUS; SUBCUTANEOUS at 05:27

## 2018-01-01 RX ADMIN — Medication 12.5 GRAM(S): at 03:56

## 2018-01-01 RX ADMIN — CEFTRIAXONE 100 GRAM(S): 500 INJECTION, POWDER, FOR SOLUTION INTRAMUSCULAR; INTRAVENOUS at 13:40

## 2018-01-01 RX ADMIN — HEPARIN SODIUM 5000 UNIT(S): 5000 INJECTION INTRAVENOUS; SUBCUTANEOUS at 22:14

## 2018-01-01 RX ADMIN — HEPARIN SODIUM 5000 UNIT(S): 5000 INJECTION INTRAVENOUS; SUBCUTANEOUS at 22:32

## 2018-01-01 RX ADMIN — Medication 25 MILLIGRAM(S): at 05:27

## 2018-01-01 RX ADMIN — Medication 25 MILLIGRAM(S): at 05:40

## 2018-01-01 RX ADMIN — CEFTRIAXONE 1 GRAM(S): 500 INJECTION, POWDER, FOR SOLUTION INTRAMUSCULAR; INTRAVENOUS at 15:30

## 2018-01-01 RX ADMIN — HEPARIN SODIUM 5000 UNIT(S): 5000 INJECTION INTRAVENOUS; SUBCUTANEOUS at 13:01

## 2018-01-01 RX ADMIN — MORPHINE SULFATE 2 MILLIGRAM(S): 50 CAPSULE, EXTENDED RELEASE ORAL at 10:15

## 2018-01-01 RX ADMIN — MIDODRINE HYDROCHLORIDE 10 MILLIGRAM(S): 2.5 TABLET ORAL at 14:53

## 2018-01-01 RX ADMIN — Medication 1: at 17:34

## 2018-01-01 RX ADMIN — TAMSULOSIN HYDROCHLORIDE 0.4 MILLIGRAM(S): 0.4 CAPSULE ORAL at 21:41

## 2018-01-01 RX ADMIN — Medication 30 MILLILITER(S): at 12:15

## 2018-01-01 RX ADMIN — LATANOPROST 1 DROP(S): 0.05 SOLUTION/ DROPS OPHTHALMIC; TOPICAL at 22:16

## 2018-01-01 RX ADMIN — SIMETHICONE 80 MILLIGRAM(S): 80 TABLET, CHEWABLE ORAL at 11:16

## 2018-01-01 RX ADMIN — LATANOPROST 1 DROP(S): 0.05 SOLUTION/ DROPS OPHTHALMIC; TOPICAL at 21:41

## 2018-01-01 RX ADMIN — SODIUM CHLORIDE 2000 MILLILITER(S): 9 INJECTION INTRAMUSCULAR; INTRAVENOUS; SUBCUTANEOUS at 13:49

## 2018-01-01 RX ADMIN — MORPHINE SULFATE 2 MILLIGRAM(S): 50 CAPSULE, EXTENDED RELEASE ORAL at 18:34

## 2018-01-01 RX ADMIN — MIDODRINE HYDROCHLORIDE 10 MILLIGRAM(S): 2.5 TABLET ORAL at 22:14

## 2018-01-01 RX ADMIN — Medication 4 UNIT(S): at 17:23

## 2018-01-01 RX ADMIN — Medication 1000 MILLILITER(S): at 12:24

## 2018-01-01 RX ADMIN — SENNA PLUS 2 TABLET(S): 8.6 TABLET ORAL at 22:14

## 2018-01-01 RX ADMIN — Medication 25 MILLIGRAM(S): at 18:24

## 2018-01-01 RX ADMIN — Medication 650 MILLIGRAM(S): at 13:45

## 2018-01-01 RX ADMIN — SODIUM CHLORIDE 500 MILLILITER(S): 9 INJECTION, SOLUTION INTRAVENOUS at 11:46

## 2018-01-01 RX ADMIN — Medication 50 MILLILITER(S): at 14:26

## 2018-01-01 RX ADMIN — ROBINUL 0.4 MILLIGRAM(S): 0.2 INJECTION INTRAMUSCULAR; INTRAVENOUS at 18:46

## 2018-01-01 RX ADMIN — INSULIN GLARGINE 10 UNIT(S): 100 INJECTION, SOLUTION SUBCUTANEOUS at 22:32

## 2018-01-01 RX ADMIN — MORPHINE SULFATE 2 MILLIGRAM(S): 50 CAPSULE, EXTENDED RELEASE ORAL at 13:10

## 2018-01-01 RX ADMIN — MIDODRINE HYDROCHLORIDE 10 MILLIGRAM(S): 2.5 TABLET ORAL at 14:20

## 2018-01-01 RX ADMIN — AMPICILLIN SODIUM AND SULBACTAM SODIUM 100 GRAM(S): 250; 125 INJECTION, POWDER, FOR SUSPENSION INTRAMUSCULAR; INTRAVENOUS at 13:20

## 2018-01-01 RX ADMIN — INSULIN HUMAN 10 UNIT(S): 100 INJECTION, SOLUTION SUBCUTANEOUS at 03:36

## 2018-01-01 RX ADMIN — AZITHROMYCIN 255 MILLIGRAM(S): 500 TABLET, FILM COATED ORAL at 14:22

## 2018-01-01 RX ADMIN — Medication 50 MEQ/KG/HR: at 20:55

## 2018-01-01 RX ADMIN — Medication 500 MILLIGRAM(S): at 15:30

## 2018-01-01 RX ADMIN — AMPICILLIN SODIUM AND SULBACTAM SODIUM 100 GRAM(S): 250; 125 INJECTION, POWDER, FOR SUSPENSION INTRAMUSCULAR; INTRAVENOUS at 13:06

## 2018-01-01 RX ADMIN — Medication 25 MILLIGRAM(S): at 06:45

## 2018-01-01 RX ADMIN — MORPHINE SULFATE 2 MILLIGRAM(S): 50 CAPSULE, EXTENDED RELEASE ORAL at 12:55

## 2018-01-01 RX ADMIN — TAMSULOSIN HYDROCHLORIDE 0.4 MILLIGRAM(S): 0.4 CAPSULE ORAL at 22:16

## 2018-01-01 RX ADMIN — SODIUM POLYSTYRENE SULFONATE 30 GRAM(S): 4.1 POWDER, FOR SUSPENSION ORAL at 03:28

## 2018-01-01 RX ADMIN — Medication 2.5 MILLIGRAM(S): at 17:31

## 2018-01-01 RX ADMIN — Medication 1: at 17:23

## 2018-01-01 RX ADMIN — Medication 50 MILLILITER(S): at 03:36

## 2018-01-01 RX ADMIN — HEPARIN SODIUM 5000 UNIT(S): 5000 INJECTION INTRAVENOUS; SUBCUTANEOUS at 13:36

## 2018-01-01 RX ADMIN — LATANOPROST 1 DROP(S): 0.05 SOLUTION/ DROPS OPHTHALMIC; TOPICAL at 22:14

## 2018-01-01 RX ADMIN — MIDODRINE HYDROCHLORIDE 10 MILLIGRAM(S): 2.5 TABLET ORAL at 22:32

## 2018-01-01 RX ADMIN — MORPHINE SULFATE 2 MILLIGRAM(S): 50 CAPSULE, EXTENDED RELEASE ORAL at 19:29

## 2018-01-01 RX ADMIN — INSULIN HUMAN 5 UNIT(S): 100 INJECTION, SOLUTION SUBCUTANEOUS at 14:21

## 2018-01-01 RX ADMIN — MORPHINE SULFATE 2 MILLIGRAM(S): 50 CAPSULE, EXTENDED RELEASE ORAL at 10:20

## 2018-01-01 RX ADMIN — Medication 50 MILLILITER(S): at 14:21

## 2018-01-01 RX ADMIN — HEPARIN SODIUM 5000 UNIT(S): 5000 INJECTION INTRAVENOUS; SUBCUTANEOUS at 13:21

## 2018-01-01 RX ADMIN — Medication 2: at 13:31

## 2018-01-01 RX ADMIN — Medication 4 UNIT(S): at 13:33

## 2018-01-01 RX ADMIN — HEPARIN SODIUM 5000 UNIT(S): 5000 INJECTION INTRAVENOUS; SUBCUTANEOUS at 06:01

## 2018-01-01 RX ADMIN — TAMSULOSIN HYDROCHLORIDE 0.4 MILLIGRAM(S): 0.4 CAPSULE ORAL at 22:01

## 2018-01-01 RX ADMIN — Medication 75 MEQ/KG/HR: at 05:28

## 2018-10-29 NOTE — H&P ADULT - ASSESSMENT
89 yo m w pmh of htn, dm, CKD, glaucoma, DLD, BPH presenting for worsening weakness and diarrhea.   In the Ed the patient was found to have Cr of 6.6 with hyperkalemia of 6.3. He was also found to have evidence of metastatic disease on CT scan.    #HAGMA/ BONITA on CKD   admit to ICU  baseline creatinine around 1.5 in August  likely prerenal, patient clinically volume depleted  start bicarb drip @ 50cc/hr as per nephrology  insert david catheter  accurate Is/ Os  US kidneys/ bladder  check phosphorus level  hold ACEI, avoid nephrotoxins  trend BMP  f/u nephrology    #hyperkalemia- improved  secondary to BONITA  EKG: sinus tachycardia, no evidence of acute ST-T changes, no peaked T waves  f/u BMP    #diarrhea/ leukocytosis/ positive UA  no abdominal pain, no evidence of colitis of CT scan  will send stool for c diff  start on rocephin for now  check blood and urine cx      #likely metastatic cancer on Ct scan  check CEA, Ca19-9  oncology evaluation  consider GI evaluation for endoscopy  discussed with daughter and son in law regarding GOC, they will think about it    #DM  hold oral hypoglycemics  start low dose basal/ bolus insulin    #HTN  cont metoprolol  hold ACEI and amlodipine for now    #DVT prophylaxis  heparin sc    #diet renal diet  from home  FULL CODE for now 88 yo m w pmh of htn, dm, CKD, glaucoma, DLD, BPH presenting for worsening weakness and diarrhea.   In the Ed the patient was found to have Cr of 6.6 with hyperkalemia of 6.3. He was also found to have evidence of metastatic disease on CT scan.    #HAGMA/ BONITA on CKD   admit to ICU  baseline creatinine around 1.5 in August  likely prerenal, patient clinically volume depleted  start bicarb drip @ 50cc/hr as per nephrology  insert david catheter  accurate Is/ Os  US kidneys/ bladder  check phosphorus level  hold ACEI, avoid nephrotoxins  trend BMP  f/u nephrology    #hyperkalemia- improved  secondary to BONITA  EKG: sinus tachycardia, no evidence of acute ST-T changes, no peaked T waves  f/u BMP    #diarrhea/ leukocytosis/ positive UA  no abdominal pain, no evidence of colitis of CT scan  will send stool for wbcs, culture and c diff  start on rocephin for now  check blood and urine cx      #likely metastatic cancer on Ct scan  check CEA, Ca19-9  oncology evaluation  consider GI evaluation for endoscopy  discussed with daughter and son in law regarding GOC, they will think about it    #DM  hold oral hypoglycemics  start low dose basal/ bolus insulin    #HTN  cont metoprolol  hold ACEI and amlodipine for now    #DVT prophylaxis  heparin sc    #diet renal diet  from home  FULL CODE for now

## 2018-10-29 NOTE — ED PROVIDER NOTE - MEDICAL DECISION MAKING DETAILS
pt with acute renal failure, hyperkalemia, sepsis, ? pneumonia.  concern for metastatic disease.  pt admitted to icu.  renal consulted.  pt given ivf, iv abx, insulin/d50.  potassium improved.  pt started on bivard drip as per icu attending.  pt admitted to icu.  fami;ly aware of results and concern for metastatic disease.

## 2018-10-29 NOTE — ED PROVIDER NOTE - CRITICAL CARE PROVIDED
interpretation of diagnostic studies/consult w/ pt's family directly relating to pts condition/documentation/additional history taking/direct patient care (not related to procedure)/consultation with other physicians

## 2018-10-29 NOTE — ED PROVIDER NOTE - OBJECTIVE STATEMENT
88 yo m w pmh of htn, dm here with multiple complaints.  pt has had cough, uri symptoms for the past 3 weeks.  no fevers, no chills.  pt also had a fall 2 weeks ago, mechanical fall and hit head, no loc.  pt also hit right upper chest.  pt also with 4 days of worsening abd distention, diarrhea.  no recent abx.  pt has no current complaints here but daughter is providing most of the history and giving information.  pt denies any pain.  no headache, no neck pain.  no cp, no sob.  no blood in stool.  Pt also with decreased po intake

## 2018-10-29 NOTE — H&P ADULT - HISTORY OF PRESENT ILLNESS
88 yo m w pmh of htn, dm, CKD, glaucoma, DLD, BPH presenting for worsening weakness and diarrhea.  History goes back to 3 weeks ago when the patient started complaining of worsening fatigue and weakness. He had a fall 2 weeks ago, mechanical fall but denies hitting his head, or loc. He also reports non productive cough and sore throat about couple of weeks ago.  Since 4 days he's been suffering from abd distention and several episodes of watery diarrhea associated with markedly decreased po intake.  no recent abx.  The family reports about 15 lb weight loss since couple of months. To note that the patient is a poor historian and some of the history was obtained from his son ion law.  pt denies any abd pain, N/V, urinary symptoms, fever or chills.  no headache, no neck pain.  no cp, no sob.  no blood in stool.   The patient lives alone and was fully functional prior to this illness.  In the Ed the patient was found to have Cr of 6.6 with hyperkalemia of 6.3.

## 2018-10-29 NOTE — H&P ADULT - NSHPPHYSICALEXAM_GEN_ALL_CORE
Vital Signs Last 24 Hrs  T(C): 35.1 (29 Oct 2018 15:31), Max: 36.7 (29 Oct 2018 12:04)  T(F): 95.2 (29 Oct 2018 15:31), Max: 98 (29 Oct 2018 12:04)  HR: 107 (29 Oct 2018 15:31) (107 - 118)  BP: 127/71 (29 Oct 2018 15:31) (115/68 - 127/71)  BP(mean): --  RR: 18 (29 Oct 2018 15:31) (18 - 18)  SpO2: 98% (29 Oct 2018 15:31) (96% - 98%)    PHYSICAL EXAM:  GENERAL: NAD, looks chronically ill  HEAD:  Atraumatic, Normocephalic  EYES: EOMI, PERRLA, conjunctiva and sclera clear  NECK: Supple, No JVD  CHEST/LUNG: bilateral breath sounds, R basal crackles  HEART: Regular rate and rhythm; No murmurs, rubs, or gallops  ABDOMEN: Soft, Nontender, distended; Bowel sounds present  EXTREMITIES:  2+ Peripheral Pulses, No clubbing, cyanosis, or edema  PSYCH: AAOx3  NEUROLOGY: non-focal  SKIN: No rashes or lesions

## 2018-10-29 NOTE — ED PROVIDER NOTE - CARE PLAN
Principal Discharge DX:	Renal failure  Secondary Diagnosis:	Sepsis  Secondary Diagnosis:	Hyperkalemia

## 2018-10-29 NOTE — ED PROVIDER NOTE - PROGRESS NOTE DETAILS
pt here with multiple issues:  URI, abd distention, diarrhea, weakness, malaise, decrteased PO intake, recent fall.  p:  labs, ekg, cxr, ct, reassess. potassium elevated.  vbg being done.  pt given ivf.  ekg normal intervals, no peaked t waves.  pt in renal failure.  will givein insulin, d50 I spoke to nephrology fellow who will come to see pt I spoke to Dr. Hart nephrologist who requests joann I spoke to icu who requests another bmp I informed pt's daughter of ct findings concerning for metastatic disease. I spoke to intensivist dr velasco who accepted pt to icu.  pt endorsed to icu resident

## 2018-10-29 NOTE — H&P ADULT - NSHPLABSRESULTS_GEN_ALL_CORE
11.4   26.93 )-----------( 469      ( 29 Oct 2018 12:25 )             34.2       10-29    131<L>  |  96<L>  |  149<HH>  ----------------------------<  352<H>  5.3<H>   |  15<L>  |  6.1<HH>    Ca    8.8      29 Oct 2018 16:00    TPro  6.0  /  Alb  2.4<L>  /  TBili  0.4  /  DBili  x   /  AST  13  /  ALT  29  /  AlkPhos  96  10-29      PT/INR - ( 29 Oct 2018 12:25 )   PT: 14.20 sec;   INR: 1.24 ratio         PTT - ( 29 Oct 2018 12:25 )  PTT:26.1 sec    Blood Gas Venous - Lactate (10.29.18 @ 14:45)    Blood Gas Venous - Lactate: 2.6 mmoL/L    Troponin T, Serum (10.29.18 @ 12:25)    Troponin T, Serum: 0.05: Critical value: ng/mL  Lipase, Serum (10.29.18 @ 12:25)    Lipase, Serum: 28 U/L    Urinalysis (10.29.18 @ 13:20)    Glucose Qualitative, Urine: Negative mg/dL    Blood, Urine: Trace    pH Urine: 5.5    Color: Yellow    Urine Appearance: Clear    Bilirubin: Negative    Ketone - Urine: Negative    Specific Gravity: 1.015    Protein, Urine: Trace mg/dL    Urobilinogen: 0.2 mg/dL    Nitrite: Negative    Leukocyte Esterase Concentration: Moderate    Blood Gas Profile - Venous (10.29.18 @ 14:45)    pH, Venous: 7.27    pCO2, Venous: 37 mmHg    pO2, Venous: 22 mmHg    HCO3, Venous: 17 mmoL/L    Base Excess, Venous: -9.1 mmoL/L    Oxygen Saturation, Venous: 30 %      < from: CT Chest/ abd pelvis No Cont (10.29.18 @ 14:38) >      IMPRESSION:  1.  Cirrhotic liver with large left hepatic lobe poorly defined lesion   and multiple nodular contour abnormalities consistent with underlying   masses. MRI dedicated to the liver with IV contrast can be performed for   further evaluation.  2.  Large volume abdominopelvic ascites with mesenteric nodularity   consistent with peritoneal carcinomatosis.  3.  Bilateral pulmonary nodules, largest measuring 1.6 cm in the right   lung, likely representing metastatic disease.  4.  Right-sided pleural effusion.  5. 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  No evidence of acute intracranial pathology.      2.  Moderate-severe chronic microvascular changes.    < end of copied text >    < from: Xray Chest 1 View-PORTABLE IMMEDIATE (10.29.18 @ 13:29) >  Right basilar opacification.  Follow-up as needed.  < end of copied text >    < from: 12 Lead ECG (10.29.18 @ 12:42) >  Ventricular Rate 106 BPM  Atrial Rate 106 BPM  P-R Interval 150 ms  QRS Duration 78 ms  Q-T Interval 330 ms  QTC Calculation(Bezet) 438 ms  P Axis 51 degrees  R Axis -9 degrees  T Axis 66 degrees  Diagnosis Line Sinus tachycardia  Poor R wave progression  Left axis deviation  Otherwise normal ECG  Confirmed by MARK BATEMAN MD ([id number]) on 10/29/2018 7:22:39 PM  < end of copied text >

## 2018-10-29 NOTE — ED PROVIDER NOTE - NS ED ROS FT
Constitutional:  no fevers, no chills, no malaise  ENMT: see hpi  Cardiac:  No chest pain  Respiratory:  No cough or sob  GI:  see hpi  MS:  No back pain, no joint pain.  Neuro:  No headache, no dizziness, no change in mental status  Skin:  No skin rash  Except as documented in the HPI,  all other systems are negative

## 2018-10-29 NOTE — H&P ADULT - FAMILY HISTORY
Child  Still living? Unknown  Family history of diabetes mellitus, Age at diagnosis: Age Unknown     Mother  Still living? Unknown  Family history of CHF (congestive heart failure), Age at diagnosis: Age Unknown  Family history of hypertension, Age at diagnosis: Age Unknown

## 2018-10-29 NOTE — ED ADULT NURSE NOTE - OBJECTIVE STATEMENT
patient states had cough URI for 3 weeks was given on Flonase  for tx from urgent care, fell 2 weeks ago tripped on sneakers, denies loc, denies pain, now has decreased po intake, malaise, states had diarrhea no fever no vomiting patient states had cough URI for 3 weeks was given on Flonase  for tx from urgent care, fell 2 weeks ago tripped on sneakers, denies loc, denies pain, now has decreased po intake, malaise, states had diarrhea no fever no vomiting, patient has abdomen distention denies any pain patient states had cough URI for 3 weeks was given on Flonase  for tx from urgent care, fell 2 weeks ago tripped on sneakers, denies loc, denies pain, now has decreased po intake, malaise, states had diarrhea no fever no vomiting, patient has abdomen distention denies any pain. patient daughter states has 15 lb weight loss since june 2018

## 2018-10-29 NOTE — ED PROVIDER NOTE - PHYSICAL EXAMINATION
CONSTITUTIONAL: Well-developed; well-nourished; in no acute distress, nontoxic appearing  SKIN: skin exam is warm and dry,  HEAD: Normocephalic; atraumatic.  EYES: EOM intact; conjunctiva and sclera clear.  ENT: MMM, no nasal congestion  NECK: Supple; non tender.  ROM intact.  CARD: S1, S2 normal, no murmur  Chest wall with old bruising to right chest wall.  RESP: No wheezes, rales or rhonchi. Good air movement bilaterally  ABD: soft; + distended, no tenderness  EXT: Normal ROM. No cyanosis or edema. Dp Pulses intact.   NEURO: awake, alert, following commands, oriented, grossly unremarkable. No Focal deficits. GCS 15.  motor/sensation intact in all 4 ext.  PSYCH: Cooperative, appropriate.

## 2018-10-29 NOTE — H&P ADULT - NSHPREVIEWOFSYSTEMS_GEN_ALL_CORE
CONSTITUTIONAL:  + weakness and fatgue, + weight loss, no fevers or chills  EYES/ENT: No visual changes;  No vertigo, + throat pain   NECK: No pain or stiffness  RESPIRATORY: + dry cough, no wheezing, hemoptysis; No shortness of breath  CARDIOVASCULAR: No chest pain or palpitations  GASTROINTESTINAL: No abdominal or epigastric pain. + abd distention, No nausea, vomiting, or hematemesis; + diarrhea. No melena or hematochezia.  GENITOURINARY: No dysuria, frequency or hematuria  NEUROLOGICAL: No numbness or weakness  SKIN: No itching, rashes

## 2018-10-30 NOTE — CONSULT NOTE ADULT - ASSESSMENT
1. BONITA  2. CKD III  3. HAGMA  4. ?Liver Ca  5. Diarrhea    Plan:  Continue bicarb drip  Bolus 0.5L NS  Onc eval  GI eval  Diagnostic paracentesis  Daily BMP  Kayexalate PRN for potassium >5.5  Blood culture  Urine culture  Stool culture, C. dif  Spoke to family at bedside, they prefer to be conservative

## 2018-10-30 NOTE — CONSULT NOTE ADULT - SUBJECTIVE AND OBJECTIVE BOX
[facility] INITIAL CONSULT NOTE  --------------------------------------------------------------------------------  HPI:  89 yo m w pmh of htn, dm, CKD stage III (last creatinine in office 1.5), glaucoma, DLD, BPH presenting for worsening weakness and diarrhea.  History goes back to 3 weeks ago when the patient started complaining of worsening fatigue and weakness. He had a fall 2 weeks ago, mechanical fall but denies hitting his head, or loc. He also reports non productive cough and sore throat about couple of weeks ago.  Since 4 days he's been suffering from abd distention and several episodes of watery diarrhea associated with markedly decreased po intake.  no recent abx.  The family reports about 15 lb weight loss since couple of months. To note that the patient is a poor historian and some of the history was obtained from his son jaja richards.  pt denies any abd pain, N/V, urinary symptoms, fever or chills.  no headache, no neck pain.  no cp, no sob.  no blood in stool.   The patient lives alone and was fully functional prior to this illness.  In the Ed the patient was found to have Cr of 6.6 with hyperkalemia of 6.3.    PAST HISTORY  --------------------------------------------------------------------------------  PAST MEDICAL & SURGICAL HISTORY:  CKD (chronic kidney disease)  Dyslipidemia  Diabetes  HTN (hypertension)  History of colonoscopy with polypectomy    FAMILY HISTORY:  Family history of hypertension (Mother)  Family history of CHF (congestive heart failure) (Mother)  Family history of diabetes mellitus (Child)    SOCIAL HISTORY:  NO current ETOH, tobacco, and illicit drug use    ALLERGIES & MEDICATIONS  --------------------------------------------------------------------------------  Allergies    No Known Allergies    Standing Inpatient Medications  ampicillin/sulbactam  IVPB      dextrose 50% Injectable 12.5 Gram(s) IV Push once  dextrose 50% Injectable 25 Gram(s) IV Push once  dextrose 50% Injectable 25 Gram(s) IV Push once  heparin  Injectable 5000 Unit(s) SubCutaneous every 8 hours  insulin glargine Injectable (LANTUS) 10 Unit(s) SubCutaneous at bedtime  insulin lispro (HumaLOG) corrective regimen sliding scale   SubCutaneous three times a day before meals  insulin lispro Injectable (HumaLOG) 4 Unit(s) SubCutaneous three times a day before meals  latanoprost 0.005% Ophthalmic Solution 1 Drop(s) Both EYES at bedtime  metoprolol tartrate 25 milliGRAM(s) Oral two times a day  sodium bicarbonate  Infusion 0.08 mEq/kG/Hr IV Continuous <Continuous>  tamsulosin 0.4 milliGRAM(s) Oral at bedtime    PRN Inpatient Medications  dextrose 40% Gel 15 Gram(s) Oral once PRN  glucagon  Injectable 1 milliGRAM(s) IntraMuscular once PRN    REVIEW OF SYSTEMS  --------------------------------------------------------------------------------  Gen: +15 pound weight loss  Skin: No rashes  Head/Eyes/Ears/Mouth: No headache; No sinus pain/discomfort, sore throat  Respiratory: No dyspnea, cough, wheezing, hemoptysis  CV: No chest pain, PND, orthopnea  GI: No abdominal pain, +distension  : No increased frequency, dysuria, hematuria, nocturia  All other systems were reviewed and are negative, except as noted.    VITALS/PHYSICAL EXAM  --------------------------------------------------------------------------------  T(C): 34.4 (10-30-18 @ 08:03), Max: 36.9 (10-29-18 @ 20:00)  HR: 88 (10-30-18 @ 10:00) (83 - 107)  BP: 111/56 (10-30-18 @ 10:00) (101/60 - 132/66)  RR: 18 (10-30-18 @ 10:00) (18 - 19)  SpO2: 98% (10-30-18 @ 10:00) (98% - 100%)  Height (cm): 172.72 (10-29-18 @ 13:08)  Weight (kg): 69.9 (10-29-18 @ 13:08)  BMI (kg/m2): 23.4 (10-29-18 @ 13:08)  BSA (m2): 1.83 (10-29-18 @ 13:08)    10-29-18 @ 07:01  -  10-30-18 @ 07:00  --------------------------------------------------------  IN: 350 mL / OUT: 800 mL / NET: -450 mL    Physical Exam:  	Gen: NAD  	HEENT: Supple neck, clear oropharynx  	Pulm: CTA B/L  	CV: RRR, S1S2  	Back: No CVA tenderness; no sacral edema  	Abd: +BS, soft, nontender, +distended  	: No suprapubic tenderness  	LE: Warm,  no edema  	Neuro: Ox3  	Skin: Warm, without rashes    LABS/STUDIES  --------------------------------------------------------------------------------              10.8   29.30 >-----------<  420      [10-30-18 @ 08:57]              32.6     140  |  101  |  146  ----------------------------<  204      [10-30-18 @ 08:57]  5.1   |  18  |  5.5        Ca     8.9     [10-30-18 @ 08:57]      Mg     2.9     [10-30-18 @ 08:57]      Phos  6.4     [10-30-18 @ 08:57]    TPro  5.6  /  Alb  2.3  /  TBili  0.4  /  DBili  0.2  /  AST  13  /  ALT  28  /  AlkPhos  90  [10-30-18 @ 08:57]    PT/INR: PT 14.20, INR 1.24       [10-29-18 @ 12:25]  PTT: 26.1       [10-29-18 @ 12:25]    Troponin 0.04      [10-30-18 @ 00:40]        [10-29-18 @ 13:40]    Creatinine Trend:  SCr 5.5 [10-30 @ 08:57]  SCr 5.7 [10-30 @ 00:40]  SCr 6.1 [10-29 @ 16:00]  SCr 6.6 [10-29 @ 12:25]    Urinalysis - [10-29-18 @ 13:20]      Color Yellow / Appearance Clear / SG 1.015 / pH 5.5      Gluc Negative / Ketone Negative  / Bili Negative / Urobili 0.2       Blood Trace / Protein Trace / Leuk Est Moderate / Nitrite Negative      RBC 1-2 / WBC 10-25 / Hyaline  / Gran  / Sq Epi  / Non Sq Epi  / Bacteria Few

## 2018-10-30 NOTE — CONSULT NOTE ADULT - SUBJECTIVE AND OBJECTIVE BOX
Patient is a 89y old  Male who presents with a chief complaint of weakness, diarrhea (29 Oct 2018 19:32)      HPI:  89 yo m w pmh of htn, dm, CKD, glaucoma, DLD, BPH presenting for worsening weakness and diarrhea.  History goes back to 3 weeks ago when the patient started complaining of worsening fatigue and weakness. He had a fall 2 weeks ago, mechanical fall but denies hitting his head, or loc. He also reports non productive cough and sore throat about couple of weeks ago.  Since 4 days he's been suffering from abd distention and several episodes of watery diarrhea associated with markedly decreased po intake.  no recent abx.  The family reports about 15 lb weight loss since couple of months. To note that the patient is a poor historian and some of the history was obtained from his son ion law.  pt denies any abd pain, N/V, urinary symptoms, fever or chills.  no headache, no neck pain.  no cp, no sob.  no blood in stool.   The patient lives alone and was fully functional prior to this illness.  In the Ed the patient was found to have Cr of 6.6 with hyperkalemia of 6.3. (29 Oct 2018 19:32)      PAST MEDICAL & SURGICAL HISTORY:  CKD (chronic kidney disease)  Dyslipidemia  Diabetes  HTN (hypertension)  History of colonoscopy with polypectomy      SOCIAL HX:   Smoking      quit > 40 years                   ETOH      Uses socially                      Other    FAMILY HISTORY:  Family history of hypertension (Mother)  Family history of CHF (congestive heart failure) (Mother)  Family history of diabetes mellitus (Child)  :  No known cardiovacular family hisotry     ROS:  See HPI     Allergies    No Known Allergies    Intolerances          PHYSICAL EXAM    ICU Vital Signs Last 24 Hrs  T(C): 36.9 (30 Oct 2018 06:22), Max: 36.9 (29 Oct 2018 20:00)  T(F): 98.5 (30 Oct 2018 06:22), Max: 98.5 (30 Oct 2018 06:22)  HR: 92 (30 Oct 2018 06:22) (83 - 118)  BP: 101/60 (30 Oct 2018 06:22) (101/60 - 132/66)  BP(mean): --  ABP: --  ABP(mean): --  RR: 18 (30 Oct 2018 06:22) (18 - 19)  SpO2: 98% (30 Oct 2018 06:22) (96% - 100%)      General: In NAD cachectic   HEENT:  JUWAN            DRY MM  Lymphatic system: No cervical LN   Lungs: Bilateral BS  Cardiovascular: Regular  Gastrointestinal: Soft, Positive BS, distended  Musculoskeletal: No clubbing.  Moves all extremities.  Full range of motion   Skin: Warm.  Intact  Neurological: No motor or sensory deficit       10-29-18 @ 07:01  -  10-30-18 @ 07:00  --------------------------------------------------------  IN:    sodium bicarbonate  Infusion: 350 mL  Total IN: 350 mL    OUT:    Indwelling Catheter - Urethral: 400 mL    Voided: 400 mL  Total OUT: 800 mL    Total NET: -450 mL          LABS:                          11.4   26.93 )-----------( 469      ( 29 Oct 2018 12:25 )             34.2                                               10    137  |  99  |  151<HH>  ----------------------------<  290<H>  6.5<HH>   |  15<L>  |  5.7<HH>    Ca    9.1      30 Oct 2018 00:40    TPro  6.0  /  Alb  2.4<L>  /  TBili  0.4  /  DBili  x   /  AST  13  /  ALT  29  /  AlkPhos  96  10-      PT/INR - ( 29 Oct 2018 12:25 )   PT: 14.20 sec;   INR: 1.24 ratio         PTT - ( 29 Oct 2018 12:25 )  PTT:26.1 sec                                       Urinalysis Basic - ( 29 Oct 2018 13:20 )    Color: Yellow / Appearance: Clear / S.015 / pH: x  Gluc: x / Ketone: Negative  / Bili: Negative / Urobili: 0.2 mg/dL   Blood: x / Protein: Trace mg/dL / Nitrite: Negative   Leuk Esterase: Moderate / RBC: 1-2 /HPF / WBC 10-25 /HPF   Sq Epi: x / Non Sq Epi: x / Bacteria: Few /HPF        CARDIAC MARKERS ( 30 Oct 2018 00:40 )  x     / 0.04 ng/mL / x     / x     / 6.6 ng/mL  CARDIAC MARKERS ( 29 Oct 2018 13:40 )  x     / x     / 131 U/L / x     / x      CARDIAC MARKERS ( 29 Oct 2018 12:25 )  x     / 0.05 ng/mL / x     / x     / x                                                LIVER FUNCTIONS - ( 29 Oct 2018 12:25 )  Alb: 2.4 g/dL / Pro: 6.0 g/dL / ALK PHOS: 96 U/L / ALT: 29 U/L / AST: 13 U/L / GGT: x                                                   < from: CT Chest No Cont (10.29.18 @ 14:38) >  IMPRESSION:  1.  Cirrhotic liver with large left hepatic lobe poorly defined lesion   and multiple nodular contour abnormalities consistent with underlying   masses. MRI dedicated to the liver with IV contrast can be performed for   further evaluation.  2.  Large volume abdominopelvic ascites with mesenteric nodularity   consistent with peritoneal carcinomatosis.  3.  Bilateral pulmonary nodules, largest measuring 1.6 cm in the right   lung, likely representing metastatic disease.  4.  Right-sided pleural effusion.  5. 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    < end of copied text >                                                                                      X-Rays                                                                                     ECHO    MEDICATIONS  (STANDING):  dextrose 50% Injectable 12.5 Gram(s) IV Push once  dextrose 50% Injectable 25 Gram(s) IV Push once  dextrose 50% Injectable 25 Gram(s) IV Push once  heparin  Injectable 5000 Unit(s) SubCutaneous every 8 hours  insulin glargine Injectable (LANTUS) 10 Unit(s) SubCutaneous at bedtime  insulin lispro (HumaLOG) corrective regimen sliding scale   SubCutaneous three times a day before meals  insulin lispro Injectable (HumaLOG) 4 Unit(s) SubCutaneous three times a day before meals  latanoprost 0.005% Ophthalmic Solution 1 Drop(s) Both EYES at bedtime  metoprolol tartrate 25 milliGRAM(s) Oral two times a day  sodium bicarbonate  Infusion 0.107 mEq/kG/Hr (50 mL/Hr) IV Continuous <Continuous>  tamsulosin 0.4 milliGRAM(s) Oral at bedtime    MEDICATIONS  (PRN):  dextrose 40% Gel 15 Gram(s) Oral once PRN Blood Glucose LESS THAN 70 milliGRAM(s)/deciliter  glucagon  Injectable 1 milliGRAM(s) IntraMuscular once PRN Glucose LESS THAN 70 milligrams/deciliter

## 2018-10-30 NOTE — CONSULT NOTE ADULT - ASSESSMENT
IMPRESSION:    BONITA - non oliguric  Metastatic malignancy - peritoneal carcinomatosis - unknown primary  Liver cirrhosis with ascitics  Possible aspiration      PLAN:    CNS: Avoid sedatives     HEENT: Oral care    PULMONARY:  HOB @ 45 degrees, prn nebs, aspiration precautions     CARDIOVASCULAR: I>O, I/O monitoring, bolus .45  x 2, renal eval, echo     GI: GI prophylaxis.  Feeding, ascitic tap with cytology, RUQ ultrasound     RENAL:  Follow up lytes.  Correct as needed, repeat lytes, cont biacrb gtt,     INFECTIOUS DISEASE: Follow up cultures, unasyn    HEMATOLOGICAL:  DVT prophylaxis. malignancy workup    ENDOCRINE:  Follow up FS    Determine GOC   Possible downgrade later today IMPRESSION:    BONITA - non oliguric  RO Metastatic malignancy - peritoneal carcinomatosis -   Liver cirrhosis with ascitics  Possible aspiration      PLAN:    CNS: Avoid sedatives     HEENT: Oral care    PULMONARY:  HOB @ 45 degrees, prn nebs, aspiration precautions.  Unasyn for now     CARDIOVASCULAR:  ECHO.  500 cc bolus and increase to drip 75.      GI: GI prophylaxis.  Feeding, ascitic tap with cytology, RUQ ultrasound.  Hep panel.      RENAL:  Follow up lytes.  Correct as needed, repeat lytes, cont biacrb gtt, FU VBG Q 4     INFECTIOUS DISEASE: Follow up cultures, Unasyn for now    HEMATOLOGICAL:  DVT prophylaxis.  LE Duplex     ENDOCRINE:  Follow up FS    Advance directives    Downgrade to medical floor after repeat K      Prognosis overall poor

## 2018-10-31 NOTE — GOALS OF CARE CONVERSATION - PERSONAL ADVANCE DIRECTIVE - CONVERSATION DETAILS
Discussed with patient HCP no diagnosis at this time however with CT scan and paracentesis prognosis is probably poor. HCP than emphasized to instate a DNR and DNI. Will continue medical management and workup at this point and re address end of life goals once diagnosis is established.

## 2018-10-31 NOTE — PROCEDURE NOTE - NSPROCDETAILS_GEN_ALL_CORE
ultrasound utilization/sterile dressing applied/location identified, sterile technique used, catheter introduced, fluid drained

## 2018-10-31 NOTE — PROCEDURE NOTE - ADDITIONAL PROCEDURE DETAILS
Given 500cc Albumin after procedure for hypotension SBP 85, and repeat after administration was 115/70

## 2018-10-31 NOTE — PROCEDURE NOTE - PROCEDURE
<<-----Click on this checkbox to enter Procedure Paracentesis at bedside  10/31/2018    Active  [id number]

## 2018-10-31 NOTE — PROGRESS NOTE ADULT - ATTENDING COMMENTS
Patient was evaluated and examined by bedside, c/o abdominal pain, no vomiting, no diarrhea, no fever, very deconditioned  All labs, radiology studies, VS was reviewed  I agree with medical plan outlined by Medical resident as stated above.  -continue IVF, f/up nephrology if patient is a candidate for HD  - newly dx. malignancy with mets- f/up abdominal fluid cytology, consult Oncology  - Hyperkalemia- tx. with low dose kayexalate, bmp daily  -leukocytosis- due to malignancy  0verall prognosis, guarded

## 2018-11-01 NOTE — CONSULT NOTE ADULT - SUBJECTIVE AND OBJECTIVE BOX
Patient is a 89y old  Male who presents with a chief complaint of weakness, diarrhea (31 Oct 2018 14:23)      HPI:  89 yo m w pmh of htn, dm, CKD, glaucoma, DLD, BPH presenting for worsening weakness and diarrhea.  History goes back to 3 weeks ago when the patient started complaining of worsening fatigue and weakness. He had a fall 2 weeks ago, mechanical fall but denies hitting his head, or loc. He also reports non productive cough and sore throat about couple of weeks ago.  Since 4 days he's been suffering from abd distention and several episodes of watery diarrhea associated with markedly decreased po intake.  no recent abx.  The family reports about 16 lbs unintentional  weight loss since couple of months. To note that the patient is a poor historian and some of the history was obtained from his son ion law.  pt denies any abd pain, N/V, urinary symptoms, fever or chills.  no headache, no neck pain.  no cp, no sob.  no blood in stool.   The patient lives alone and was fully functional prior to this illness.  In the Ed the patient was found to have Cr of 6.6 with hyperkalemia of 6.3.   Ct abd done after the admission showed large abdominopelvic ascites and poorly defined hepatic lobe mass. Pt had large volume paracentesis done yesterday .    Daughter reports that pt was uptodate with his screeing and had colonoscopy done about 5 years ago , after the colonoscopy done before it showed a polyp , that was found to be benign . Repeat colonoscopy about 5 years ago qwas normal. Pt also had prostate biopsy in past and it was normal.   Pt quit smoking > 30 years ago and no h/o alcohol abuse. Pt was never diagnosed with hepatitis in past.    ROS:  Negative except for: weight loss, abdominal pain .    PAST MEDICAL & SURGICAL HISTORY:  CKD (chronic kidney disease)  Dyslipidemia  Diabetes  HTN (hypertension)  History of colonoscopy with polypectomy      SOCIAL HISTORY:    FAMILY HISTORY:  Family history of hypertension (Mother)  Family history of CHF (congestive heart failure) (Mother)  Family history of diabetes mellitus (Child)      MEDICATIONS  (STANDING):  ampicillin/sulbactam  IVPB      ampicillin/sulbactam  IVPB 1.5 Gram(s) IV Intermittent daily  dextrose 50% Injectable 12.5 Gram(s) IV Push once  dextrose 50% Injectable 25 Gram(s) IV Push once  dextrose 50% Injectable 25 Gram(s) IV Push once  heparin  Injectable 5000 Unit(s) SubCutaneous every 8 hours  insulin glargine Injectable (LANTUS) 10 Unit(s) SubCutaneous at bedtime  insulin lispro (HumaLOG) corrective regimen sliding scale   SubCutaneous three times a day before meals  insulin lispro Injectable (HumaLOG) 4 Unit(s) SubCutaneous three times a day before meals  latanoprost 0.005% Ophthalmic Solution 1 Drop(s) Both EYES at bedtime  metoprolol tartrate 25 milliGRAM(s) Oral two times a day  sodium bicarbonate  Infusion 0.08 mEq/kG/Hr (75 mL/Hr) IV Continuous <Continuous>  tamsulosin 0.4 milliGRAM(s) Oral at bedtime    MEDICATIONS  (PRN):  acetaminophen   Tablet .. 650 milliGRAM(s) Oral every 6 hours PRN Mild Pain (1 - 3)  dextrose 40% Gel 15 Gram(s) Oral once PRN Blood Glucose LESS THAN 70 milliGRAM(s)/deciliter  glucagon  Injectable 1 milliGRAM(s) IntraMuscular once PRN Glucose LESS THAN 70 milligrams/deciliter  morphine  - Injectable 2 milliGRAM(s) IV Push every 6 hours PRN Severe Pain (7 - 10)      Weight (kg): 69.5 (11-01-18 @ 06:40)  Allergies    No Known Allergies    Intolerances        Vital Signs Last 24 Hrs  T(C): 36.4 (01 Nov 2018 05:11), Max: 36.4 (31 Oct 2018 20:25)  T(F): 97.6 (01 Nov 2018 05:11), Max: 97.6 (01 Nov 2018 05:11)  HR: 91 (01 Nov 2018 05:11) (87 - 96)  BP: 113/58 (01 Nov 2018 05:11) (80/54 - 120/61)  BP(mean): --  RR: 18 (01 Nov 2018 05:11) (18 - 18)  SpO2: 95% (31 Oct 2018 19:37) (95% - 95%)    PHYSICAL EXAM  General: lying on bed ; in discomfort , c/o abdominal pain.  HEENT: pale sclera .  Neck: supple  CV: normal S1/S2 with no murmur rubs or gallops  Lungs: positive air movement b/l ant lungs,clear to auscultation, no wheezes, no rales  Abdomen: distended , tender to palpation , bs +  Ext: no clubbing cyanosis or edema  Skin: no rashes and no petechiae  Neuro: ao x 3 ; no focal deficits.      LABS:                          11.2   34.50 )-----------( 422      ( 31 Oct 2018 10:27 )             33.5         Mean Cell Volume : 88.6 fL  Mean Cell Hemoglobin : 29.6 pg  Mean Cell Hemoglobin Concentration : 33.4 g/dL  Auto Neutrophil # : 31.76 K/uL  Auto Lymphocyte # : 0.34 K/uL  Auto Monocyte # : 1.15 K/uL  Auto Eosinophil # : 0.23 K/uL  Auto Basophil # : 0.05 K/uL  Auto Neutrophil % : 92.1 %  Auto Lymphocyte % : 1.0 %  Auto Monocyte % : 3.3 %  Auto Eosinophil % : 0.7 %  Auto Basophil % : 0.1 %      Serial CBC's  10-31 @ 10:27  Hct-33.5 / Hgb-11.2 / Plat-422 / RBC-3.78 / WBC-34.50  Serial CBC's  10-31 @ 02:06  Hct-30.9 / Hgb-10.3 / Plat-382 / RBC-3.49 / WBC-37.15  Serial CBC's  10-30 @ 08:57  Hct-32.6 / Hgb-10.8 / Plat-420 / RBC-3.66 / WBC-29.30  Serial CBC's  10-29 @ 12:25  Hct-34.2 / Hgb-11.4 / Plat-469 / RBC-3.85 / WBC-26.93      10-31    143  |  100  |  148<HH>  ----------------------------<  151<H>  5.1<H>   |  19  |  5.2<HH>    Ca    8.7      31 Oct 2018 10:27    TPro  5.5<L>  /  Alb  2.3<L>  /  TBili  0.3  /  DBili  <0.2  /  AST  14  /  ALT  27  /  AlkPhos  88  10-30      PT/INR - ( 31 Oct 2018 10:27 )   PT: 15.60 sec;   INR: 1.36 ratio         PTT - ( 31 Oct 2018 10:27 )  PTT:29.5 sec    Hepatitis C Antibody Test (10.30.18 @ 17:41)    Hepatitis C Virus S/CO Ratio: 0.09 S/CO    Hepatitis C Virus Interpretation: Nonreact: Hepatitis C AB  S/CO Ratio                        Interpretation  < 1.0                                     Non-Reactive  1.0 - 4.9                           Weakly-Reactive  > 5.0                                 Reactive  Non-Reactive: A person witha non-reactive HCV antibody result is  considered uninfected.  No further action is needed unless recent  infection is suspected.  In these cases, consider repeat testing later to  detect seroconversion..  Weakly-Reactive: HCV antibody test is abnormal, HCV RNA Qualitative test  will follow.  Reactive: HCV antibody test is abnormal, HCV RNA Qualitative test will  follow.  Note: HCV antibody testing is performed on the [employer]  system.    Hepatitis B Surface Antigen (10.30.18 @ 17:41)    Hepatitis B Surface Antigen: Nonreact  CA 19-9 211  CEA 5.5                  BLOOD SMEAR INTERPRETATION:       RADIOLOGY & ADDITIONAL STUDIES:      < from: CT Abdomen and Pelvis No Cont (10.29.18 @ 14:38) >  IMPRESSION:  1.  Cirrhotic liver with large left hepatic lobe poorly defined lesion   and multiple nodular contour abnormalities consistent with underlying   masses. MRI dedicated to the liver with IV contrast can be performed for   further evaluation.  2.  Large volume abdominopelvic ascites with mesenteric nodularity   consistent with peritoneal carcinomatosis.  3.  Bilateral pulmonary nodules, largest measuring 1.6 cm in the right   lung, likely representing metastatic disease.  4.  Right-sided pleural effusion.  5. 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  Cirrhotic liver with large left hepatic lobe poorly defined lesion   and multiple nodular contour abnormalities consistent with underlying   masses. MRI dedicated to the liver with IV contrast can be performed for   further evaluation.  2.  Large volume abdominopelvic ascites with mesenteric nodularity   consistent with peritoneal carcinomatosis.  3.  Bilateral pulmonary nodules, largest measuring 1.6 cm in the right   lung, likely representing metastatic disease.  4.  Right-sided pleural effusion.  5. 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

## 2018-11-01 NOTE — CONSULT NOTE ADULT - ASSESSMENT
88 yo m w pmh of htn, dm, CKD, glaucoma, DLD, BPH presenting for worsening weakness and diarrhea.  History goes back to 3 weeks ago when the patient started complaining of worsening fatigue and weakness.  Pt had ct abd done that showed large abdominopelvic ascites and large liver mass . Oncology was consulted for recommendations regarding further management of mass. 88 yo m w pmh of htn, dm, CKD, glaucoma, DLD, BPH presenting for worsening weakness and diarrhea.  History goes back to 3 weeks ago when the patient started complaining of worsening fatigue and weakness.  Pt had ct abd done that showed large abdominopelvic ascites and large liver mass . Oncology was consulted for recommendations regarding further management of mass.    # PERITONEAL CARCINOMATOSIS / LIVER LESION / B/L LUNG NODULE LIKELY ADVANCED METASTATIC DISEASE UNKNOWN PRIMARY.    -tissue biopsy is required to make final diagnosis , but pt is medically not stable for biopsy , discussed with family in details, Daughter who is health care proxy wants conservative measures for now.  - Pain control.  -Hospice evaluation.

## 2018-11-01 NOTE — PROGRESS NOTE ADULT - ATTENDING COMMENTS
Patient was evaluated and examined by bedside, c/o abdominal pain, no vomiting, no diarrhea, no fever, very deconditioned  All labs, radiology studies, VS was reviewed  I agree with medical plan outlined by Medical resident as stated above.  -continue IVF, monitor daily I and O, BMP daily  - newly dx. malignancy with mets- f/up abdominal fluid cytology, consult Oncology- f/up recommendations  - Hyperkalemia- tx. with low dose kayexalate, bmp daily  -leukocytosis- due to malignancy  -pain management  0verall prognosis, guarded .   Advanced directives was addressed- patient is DNR/DNI

## 2018-11-01 NOTE — CONSULT NOTE ADULT - ATTENDING COMMENTS
pt seen/examined with hem/onc fellows and resident; note reviewed;     had a family meeting with the patient's daughter and  two granddaughters     - explained that biopsy is not available; carcinomatosis or lymphocytosis can be due to GI malignancy or lymphoma.   - agree with the family that pt has poor performance status for both; to go through biopsy and to tolerate and benefit systemic chemotherapy. I believe best supportive care is the most humane and compassionate approach. please call hospice consult: the patient and the family are agreeable.

## 2018-11-02 NOTE — PROGRESS NOTE ADULT - ATTENDING COMMENTS
Patient was evaluated and examined by bedside,  c/o abdominal pain, with poor po intake    All labs, radiology studies, VS was reviewed  I agree with medical plan outlined by Medical resident as stated above.  - newly dx. malignancy with mets- as per family decision, they interested to proceed with hospice care for comfort level of tx.  - Hyperkalemia- tx. with low dose kayexalate,  -leukocytosis- due to malignancy  -pain management  0verall prognosis, guarded .   Advanced directives was addressed- patient is DNR/DNI .   -f/up family meeting today with Hospice team.

## 2018-11-03 NOTE — PROGRESS NOTE ADULT - NSHPATTENDINGPLANDISCUSS_GEN_ALL_CORE
patient's family
patient's family, medical resident, covering nurse
patient's family, medical resident, covering nurse
patient, medical resident,

## 2018-11-03 NOTE — DISCHARGE NOTE FOR THE EXPIRED PATIENT - HOSPITAL COURSE
88 yo m w pmh of htn, dm, CKD, glaucoma, DLD, BPH presenting for worsening weakness and diarrhea.  History goes back to 3 weeks ago when the patient started complaining of worsening fatigue and weakness. He had a fall 2 weeks ago, mechanical fall but denies hitting his head, or loc. He also reports non productive cough and sore throat about couple of weeks ago. He's been suffering from abd distention and several episodes of watery diarrhea associated with markedly decreased po intake.  no recent abx.  The family reports about 15 lb weight loss since couple of months.  Pt was admitted for [facility] and was on a bicarb drip. CT A/P showed peritoneal carcinomatosis most likely 2/2 to metastatic cancer wth unknown origin. Patient and family decided to sign DNR/DNI and due to most likely very poor prognosis, patient became a hospice candidate. Patient was on standing and PRN morphine and was pending placement in hospice facility. Patient passed away on 11/3/18 at 23:49 with family at bedside.

## 2018-11-03 NOTE — PROGRESS NOTE ADULT - ASSESSMENT
1. BONITA  2. CKD III  3. HAGMA  4. ?Liver Ca  5. Diarrhea    Plan:  Continue bicarb drip  Onc eval  GI eval  Diagnostic paracentesis  Daily BMP  Kayexalate PRN for potassium >5.5  Blood culture  Urine culture  Stool culture, C. dif  Urine eosinophils  Urine protein/creatinine ratio  Spoke to family at bedside, they prefer to be conservative
1. BONITA  2. CKD III  3. HAGMA  4. ?Liver Ca  5. Diarrhea    Plan:  DC bicarb drip  Onc eval  GI eval  Diagnostic paracentesis  Daily BMP  Kayexalate PRN for potassium >5.5  Blood culture  Urine culture  Stool culture, C. dif  Urine eosinophils  Urine protein/creatinine ratio  Start Marinol to stimulate appetite  Midodrine to improve renal perfusion  Spoke to family at bedside, they prefer to be conservative
88 yo m w pmh of htn, dm, CKD, glaucoma, DLD, BPH presenting for worsening weakness and diarrhea.   In the Ed the patient was found to have Cr of 6.6 with hyperkalemia of 6.3. He was also found to have evidence of metastatic disease on CT scan.    #HAGMA/ BONITA on CKD   creatinine trending down  non-oliguric  baseline creatinine around 1.5 in August  likely prerenal, patient clinically volume depleted  increase bicarb drip to 75cc/hr   keep david catheter  accurate Is/ Os  US kidneys/ bladder  hold ACEI, avoid nephrotoxins  trend BMP  f/u nephrology    #hyperkalemia- improved  secondary to BONITA  EKG: sinus tachycardia, no evidence of acute ST-T changes, no peaked T waves  f/u BMP    #possible aspiration?/ leukocytosis/ positive UA/ diarrhea  start unasyn  no abdominal pain, no evidence of colitis of CT scan  stool for wbcs, culture and c diff  check blood and urine cx      #likely metastatic cancer on Ct scan  liver cirrhosis and large volume ascites + peritoneal carcinomatosis on CT scan  RUQ sono  check hep serology  will need diagnostic and therapeutic peritoneal tap  check CEA, Ca19-9  consider GI evaluation for endoscopy  discussed with daughter and son in law regarding GOC, they will think about it    #DM  hold oral hypoglycemics  start low dose basal/ bolus insulin    #HTN  cont metoprolol  hold ACEI and amlodipine for now    #DVT prophylaxis  heparin sc    #diet renal diet  from home  FULL CODE for now
89M HTN, CKD, glaucoma, DLD, BPH presenting for worsening weakness and diarrhea. Found to have BONITA on CKD with Cr of 6.6 with hyperkalemia of 6.3. He was also found to have evidence of metastatic disease on CT scan.    # Leukocytosis 2/2 Urinary source vs diarrhea vs malignancy induced  - will continue to monitor however patient family now seeking hospice care    # Highly suspicious metastatic cancer on Ct scan liver cirrhosis and large volume ascites + peritoneal carcinomatosis on CT scan  - patient will be evaluated by hospice as per families request    # BONITA on CKD Nonoliguric 2/2 pre renal.   - will continue current management and follow up with nephro  - will also discuss with family to determine care requested following hospice    # Hyperkalemia secondary to BONITA  - resolved    # HTN cont metoprolol  - holding meds for now  - controlled, however low    # DM - SSI    GI PPx - Protonix  DVT PPx Heparin 5000 mg SubQ   Activity OOB w/ assist  Dispo- will determine after hospice consult  DNR / DNI
89M HTN, CKD, glaucoma, DLD, BPH presenting for worsening weakness and diarrhea. Found to have BONITA on CKD with Cr of 6.6 with hyperkalemia of 6.3. He was also found to have evidence of metastatic disease on CT scan.    # Leukocytosis 2/2 Urinary source vs diarrhea vs malignancy induced  - will continue to monitor however patient family now seeking hospice care    # Highly suspicious metastatic cancer on Ct scan liver cirrhosis and large volume ascites + peritoneal carcinomatosis on CT scan  - patient will be evaluated by hospice as per families request    # BONITA on CKD Nonoliguric 2/2 pre renal.   - will continue current management and follow up with nephro  - will also discuss with family to determine care requested following hospice    # Hyperkalemia secondary to BONITA  - resolved    # HTN cont metoprolol  - holding meds for now  - controlled, however low    # DM - SSI    GI PPx - Protonix  DVT PPx Heparin 5000 mg SubQ   Activity OOB w/ assist  Dispo- will determine after hospice consult  DNR / DNI
89M HTN, CKD, glaucoma, DLD, BPH presenting for worsening weakness and diarrhea. Found to have BONITA on CKD with Cr of 6.6 with hyperkalemia of 6.3. He was also found to have evidence of metastatic disease on CT scan.    # Leukocytosis 2/2 positive UA vs diarrhea vs malignancy induced  # Highly suspicious metastatic cancer on Ct scan liver cirrhosis and large volume ascites + peritoneal carcinomatosis on CT scan  - s/p paracentesis 1L removed. Large amt RBC and blood.              - Follow up gram stain and cytology from para  - c/w Unasyn for now.   - Blood culture NGTD.   - Hep A IgG Ab reactive. Remainder panel neg.   - CEA 5.5 Ca19-9 211  - consider GI vs IR for Liver biopsy vs evaluation for endoscopy and tissue diagnosis, family seems to be leaning more toward palliative measures.   - Heme onc to evaluate - fu recommendations    # HAGMA  # BONITA on CKD Nonoliguric 2/2 pre renal.   - Baseline creatinine around 1.5 in August  - Renal U/S noted -- no stones or hydro  - c/w bicarb drip to 75cc/hr   - c/w Gallagher catheter  - Strict I/O  - Hold ACEI, avoid nephrotoxins  - Trend BMP  - Nephrology following.     # Hyperkalemia secondary to BONITA  EKG: sinus tachycardia, no evidence of acute ST-T changes, no peaked T waves  K+ 5.1 this AM. Monitor     # HTN cont metoprolol  - hold ACE I and amlodipine for now    # DM - SSI  # GI PPx - (X)Protonix () Not indicated  # DVT PPx - (X) Heparin 5000 mg SubQ   # Activity -   (X) OOB w/ assist  () Bed Rest  # Dispo -   Patient to be discharged when medically optimized.  # Code Status - () FULL    (X) DNR / DNI MOLST FORM IN CHART    (X) Discussed Case and Plan with the Medical Attending.    Please call Dr. Ding with any questions/ recommendations: Spectra #[id number]
89M HTN, CKD, glaucoma, DLD, BPH presenting for worsening weakness and diarrhea. Found to have BONITA on CKD with Cr of 6.6 with hyperkalemia of 6.3. He was also found to have evidence of metastatic disease on CT scan.    # Leukocytosis 2/2 positive UA vs diarrhea vs malignancy induced  # Highly suspicious metastatic cancer on Ct scan liver cirrhosis and large volume ascites + peritoneal carcinomatosis on CT scan  - s/p paracentesis 1L removed. Large amt RBC and blood. Repeat CBC 1600  - c/w Unasyn for now.   - Blood culture NGTD.   - Follow up gram stain and cytology from para  - Hep A IgG Ab reactive. Remainder panel neg.   - CEA 5.5 Ca19-9 211  - consider GI evaluation for endoscopy and tissue diangosis  - Heme onc to evaluate    # HAGMA  # BONITA on CKD Nonoliguric  - Baseline creatinine around 1.5 in August  - Renal U/S noted -- no stones or hydro  - Likely prerenal, patient clinically volume depleted  - c/w bicarb drip to 75cc/hr   - c/w Gallagher catheter  - Strict I/O  - Hold ACEI, avoid nephrotoxins  - Trend BMP  - Nephrology following.     # Hyperkalemia secondary to BONITA  EKG: sinus tachycardia, no evidence of acute ST-T changes, no peaked T waves  Daily BMP    # HTN cont metoprolol  - hold ACE I and amlodipine for now    # DM - SSI  # GI PPx - (X)Protonix () Not indicated  # DVT PPx - (X) Heparin 5000 mg SubQ   # Activity -   (X) OOB w/ assist  () Bed Rest  # Dispo -   Patient to be discharged when medically optimized.  # Code Status - () FULL    (X) DNR / DNI MOLST FORM IN CHART    (X) Discussed Case and Plan with the Medical Attending.    Please call Dr. Ding with any questions/ recommendations: Spectra #[phone]
BONITA    - poor uop  - cr worse   - likely due to ascites / peritoneal carcinomatosis   CKD 3  metabolic acidosis   - better  hyperkalemia   - resolved  diffuse abdominal metastatic disease  ascites  diarrhea  hypotension  cachexia     plan:    cont midodrine  cont betablocker for tachycardia   off amlodipine / benazepril  off flomax  cont david  consider palliative low volume (2-3L)  therapeutic paracentesis with albumin  monitor cmp, phos, uop  abx per primary team (adjust for GFR)  family to decide on likely hospice  hospice consult pending  would also get palliative care involved  will follow  overall poor prognosis
BONITA    - some uop   - cr fluctuating   - likely due to ascites / peritoneal carcinomatosis   CKD 3  metabolic acidosis   - better  hyperkalemia   - resolved  diffuse abdominal metastatic disease  diarrhea  hypotension  cachexia     plan:    cont midodrine  cont betablocker for tachycardia   off amlodipine / benazepril  dc flomax - due to hypotension and pt with david anyway  cont david  monitor cmp, phos, uop  family leaning towards non-dialytic care, but can offer dialysis only if also plan to treat metastatic disease, however unlikely  abx per primary team (adjust for GFR)  will follow  overall poor prognosis   advance care planning and advance care directives reviewed with pt and family / DNR and DNI

## 2018-11-03 NOTE — PROGRESS NOTE ADULT - SUBJECTIVE AND OBJECTIVE BOX
ARTUR, CHALO  [facility] T2-3A 027 A ([facility] T2-3A)            Patient was evaluated and examined  by bedside, had episode of anxiety yesterday, c/o abdominal pain, no  nausea, no vomiting          REVIEW OF SYSTEMS:  please see pertinent positives mentioned above, all other 12 ROS negative      T(C): , Max: 36.6 (11-03-18 @ 05:15)  HR: 84 (11-02-18 @ 20:30)  BP: 99/57 (11-03-18 @ 05:15)  RR: 16 (11-03-18 @ 05:15)  SpO2: 93% (11-03-18 @ 09:08)  CAPILLARY BLOOD GLUCOSE      POCT Blood Glucose.: 65 mg/dL (03 Nov 2018 11:56)  POCT Blood Glucose.: 93 mg/dL (03 Nov 2018 07:56)  POCT Blood Glucose.: 166 mg/dL (03 Nov 2018 04:08)  POCT Blood Glucose.: 57 mg/dL (03 Nov 2018 03:06)  POCT Blood Glucose.: 63 mg/dL (03 Nov 2018 02:30)  POCT Blood Glucose.: 85 mg/dL (02 Nov 2018 20:38)  POCT Blood Glucose.: 114 mg/dL (02 Nov 2018 16:44)  POCT Blood Glucose.: 72 mg/dL (02 Nov 2018 12:21)      PHYSICAL EXAM:  GENERAL: NAD, AAOX3, patient is laying comfortably in bed, very deconditioned  HEENT: AT, NC, PERRLA, SUPPLE, NO JVD, NO CB  LUNG: mild decreased breath sounds B/L  CVS: normal S1, S2, RRR, NO M/G/R  ABDOMEN: distended, bowel sounds present, hypoactive, tender diffusely  EXT: no E/C/C, positive PP b/l extremities  NEURO: severe physical deconditioning, diffuse generalized body weakness  SKIN: no rash, no ecchymosis        LAB  CBC  Date: 11-02-18 @ 09:13  Mean cell Zwqanaeizt41.6  Mean cell Hemoglobin Conc32.9  Mean cell Volum 89.9  Platelet count-Automate 348  RBC Count 3.48  Red Cell Distrib Width16.0  WBC Count34.91  % Albumin, Urine--  Hematocrit 31.3  Hemoglobin 10.3  CBC  Date: 10-31-18 @ 10:27  Mean cell Uwwvesmuha04.6  Mean cell Hemoglobin Conc33.4  Mean cell Volum 88.6  Platelet count-Automate 422  RBC Count 3.78  Red Cell Distrib Width15.6  WBC Count34.50  % Albumin, Urine--  Hematocrit 33.5  Hemoglobin 11.2  CBC  Date: 10-31-18 @ 02:06  Mean cell Pmpwortyir20.5  Mean cell Hemoglobin Conc33.3  Mean cell Volum 88.5  Platelet count-Automate 382  RBC Count 3.49  Red Cell Distrib Width15.5  WBC Count37.15  % Albumin, Urine--  Hematocrit 30.9  Hemoglobin 10.3  CBC  Date: 10-30-18 @ 08:57  Mean cell Woyyidribf67.5  Mean cell Hemoglobin Conc33.1  Mean cell Volum 89.1  Platelet count-Automate 420  RBC Count 3.66  Red Cell Distrib Width15.4  WBC Count29.30  % Albumin, Urine--  Hematocrit 32.6  Hemoglobin 10.8  CBC  Date: 10-29-18 @ 12:25  Mean cell Pkjtwmknod40.6  Mean cell Hemoglobin Conc33.3  Mean cell Volum 88.8  Platelet count-Automate 469  RBC Count 3.85  Red Cell Distrib Width15.4  WBC Count26.93  % Albumin, Urine--  Hematocrit 34.2  Hemoglobin 11.4    [facility]  11-02-18 @ 09:13  Blood Gas Arterial-Calcium,Ionized--  Blood Urea Nitrogen, Serum 150 mg/dL<HH> [10 - 20] [Critical value:]  Carbon Dioxide, Serum21 mmol/L [17 - 32]  Chloride, Xwinh649 mmol/L [98 - 110]  Creatinie, Serum5.0 mg/dL<HH> [0.7 - 1.5] [Critical value:]  Glucose, Serum82 mg/dL [70 - 99]  Potassium, Serum4.5 mmol/L [3.5 - 5.0]  Sodium, Serum 144 mmol/L [135 - 146]  [facility]  11-01-18 @ 11:49  Blood Gas Arterial-Calcium,Ionized--  Blood Urea Nitrogen, Serum 141 mg/dL<HH> [10 - 20] [Critical value:]  Carbon Dioxide, Serum21 mmol/L [17 - 32]  Chloride, Tapky871 mmol/L [98 - 110]  Creatinie, Serum4.3 mg/dL<HH> [0.7 - 1.5] [Critical value:]  Glucose, Xqjzv700 mg/dL<H> [70 - 99]  Potassium, Serum4.2 mmol/L [3.5 - 5.0]  Sodium, Serum 146 mmol/L [135 - 146]  [facility]  10-31-18 @ 10:27  Blood Gas Arterial-Calcium,Ionized--  Blood Urea Nitrogen, Serum 148 mg/dL<HH> [10 - 20] [Critical value:]  Carbon Dioxide, Serum19 mmol/L [17 - 32]  Chloride, Zuhmd309 mmol/L [98 - 110]  Creatinie, Serum5.2 mg/dL<HH> [0.7 - 1.5] [Critical value:]  Glucose, Mrvzg117 mg/dL<H> [70 - 99]  Potassium, Serum5.1 mmol/L<H> [3.5 - 5.0]  Sodium, Serum 143 mmol/L [135 - 146]  [facility]  10-31-18 @ 02:06  Blood Gas Arterial-Calcium,Ionized--  Blood Urea Nitrogen, Serum 146 mg/dL<HH> [10 - 20] [Critical value:]  Carbon Dioxide, Serum18 mmol/L [17 - 32]  Chloride, Serum99 mmol/L [98 - 110]  Creatinie, Serum4.7 mg/dL<HH> [0.7 - 1.5] [Critical value:]  Glucose, Jmlhe106 mg/dL<H> [70 - 99]  Potassium, Serum4.8 mmol/L [3.5 - 5.0]  Sodium, Serum 138 mmol/L [135 - 146]  [facility]  10-30-18 @ 17:41  Blood Gas Arterial-Calcium,Ionized--  Blood Urea Nitrogen, Serum 149 mg/dL<HH> [10 - 20] [Critical value:]  Carbon Dioxide, Serum17 mmol/L [17 - 32]  Chloride, Serum99 mmol/L [98 - 110]  Creatinie, Serum5.1 mg/dL<HH> [0.7 - 1.5] [Critical value:]  Glucose, Thrby137 mg/dL<H> [70 - 99]  Potassium, Serum5.4 mmol/L<H> [3.5 - 5.0]  Sodium, Serum 139 mmol/L [135 - 146]  [facility]  10-30-18 @ 08:57  Blood Gas Arterial-Calcium,Ionized--  Blood Urea Nitrogen, Serum 146 mg/dL<HH> [10 - 20] [Critical value:]  Carbon Dioxide, Serum18 mmol/L [17 - 32]  Chloride, Nnzgu792 mmol/L [98 - 110]  Creatinie, Serum5.5 mg/dL<HH> [0.7 - 1.5] [Critical value:]  Glucose, Qflpd349 mg/dL<H> [70 - 99]  Potassium, Serum5.1 mmol/L<H> [3.5 - 5.0]  Sodium, Serum 140 mmol/L [135 - 146]  [facility]  10-30-18 @ 00:40  Blood Gas Arterial-Calcium,Ionized--  Blood Urea Nitrogen, Serum 151 mg/dL<HH> [10 - 20] [Critical value:  SPECIMEN NOT HEMOLYZED  NOTIFIED  AT 2:22AM 10/30/18]  Carbon Dioxide, Serum15 mmol/L<L> [17 - 32] [SPECIMEN NOT HEMOLYZED  NOTIFIED  AT 2:22AM 10/30/18]  Chloride, Serum99 mmol/L [98 - 110] [SPECIMEN NOT HEMOLYZED  NOTIFIED  AT 2:22AM 10/30/18]  Creatinie, Serum5.7 mg/dL<HH> [0.7 - 1.5] [Critical value:  SPECIMEN NOT HEMOLYZED  NOTIFIED  AT 2:22AM 10/30/18]  Glucose, Dfuht250 mg/dL<H> [70 - 99] [SPECIMEN NOT HEMOLYZED  NOTIFIED  AT 2:22AM 10/30/18]  Potassium, Serum6.5 mmol/L<HH> [3.5 - 5.0] [Critical value:  SPECIMEN NOT HEMOLYZED  NOTIFIED  AT 2:22AM 10/30/18]  Sodium, Serum 137 mmol/L [135 - 146] [SPECIMEN NOT HEMOLYZED  NOTIFIED  AT 2:22AM 10/30/18]  [facility]  10-29-18 @ 16:00  Blood Gas Arterial-Calcium,Ionized--  Blood Urea Nitrogen, Serum 149 mg/dL<HH> [10 - 20] [Critical value:]  Carbon Dioxide, Serum15 mmol/L<L> [17 - 32]  Chloride, Serum96 mmol/L<L> [98 - 110]  Creatinie, Serum6.1 mg/dL<HH> [0.7 - 1.5] [Critical value:]  Glucose, Dgeqf240 mg/dL<H> [70 - 99]  Potassium, Serum5.3 mmol/L<H> [3.5 - 5.0]  Sodium, Serum 131 mmol/L<L> [135 - 146]  BMP  10-29-18 @ 12:25  Blood Gas Arterial-Calcium,Ionized--  Blood Urea Nitrogen, Serum 163 mg/dL<HH> [10 - 20] [Critical value:  TYPE:(C=Critical, N=Notification, A=Abnormal) C  TESTS: BUN  DATE/TIME CALLED: 10/29/18 13:41  CALLED TO: DR. CASTELAN  READ BACK (2 Patient Identifiers)(Y/N): Y  READ BACK VALUES (Y/N): Y  CALLED BY: [facility]]  Carbon Dioxide, Serum16 mmol/L<L> [17 - 32]  Chloride, Serum95 mmol/L<L> [98 - 110]  Creatinie, Serum6.6 mg/dL<HH> [0.7 - 1.5] [Critical value:]  Glucose, Ytrkr721 mg/dL<H> [70 - 99]  Potassium, Serum6.3 mmol/L<HH> [3.5 - 5.0] [Critical value:  TYPE:(C=Critical, N=Notification, A=Abnormal) C  TESTS: K NOT HEMOLYZED,CREAT,TROP  DATE/TIME CALLED: 10/29/18 13:39  CALLED TO: DR. CASTELAN  READ BACK (2 Patient Identifiers)(Y/N): Y  READ BACK VALUES (Y/N): Y  CALLED BY: [facility]]  Sodium, Serum 130 mmol/L<L> [135 - 146]              Microbiology:    Culture - Body Fluid with Gram Stain (collected 10-31-18 @ 11:50)  Source: Paracentesis None  Gram Stain (10-31-18 @ 22:46):    polymorphonuclear leukocytes seen    No organisms seen    by cytocentrifuge  Preliminary Report (11-01-18 @ 16:15):    No growth to date.    Culture - Blood (collected 10-30-18 @ 00:40)  Source: .Blood None  Preliminary Report (10-31-18 @ 13:02):    No growth to date.          Medications:  acetaminophen   Tablet .. 650 milliGRAM(s) Oral every 6 hours PRN  aluminum hydroxide/magnesium hydroxide/simethicone Suspension 30 milliLiter(s) Oral every 4 hours PRN  ampicillin/sulbactam  IVPB      ampicillin/sulbactam  IVPB 1.5 Gram(s) IV Intermittent daily  dextrose 40% Gel 15 Gram(s) Oral once PRN  dextrose 50% Injectable 12.5 Gram(s) IV Push once  dextrose 50% Injectable 25 Gram(s) IV Push once  dextrose 50% Injectable 25 Gram(s) IV Push once  docusate sodium 100 milliGRAM(s) Oral daily  dronabinol 2.5 milliGRAM(s) Oral two times a day  glucagon  Injectable 1 milliGRAM(s) IntraMuscular once PRN  heparin  Injectable 5000 Unit(s) SubCutaneous every 8 hours  insulin glargine Injectable (LANTUS) 10 Unit(s) SubCutaneous at bedtime  insulin lispro (HumaLOG) corrective regimen sliding scale   SubCutaneous three times a day before meals  insulin lispro Injectable (HumaLOG) 4 Unit(s) SubCutaneous three times a day before meals  latanoprost 0.005% Ophthalmic Solution 1 Drop(s) Both EYES at bedtime  LORazepam     Tablet 1 milliGRAM(s) Oral at bedtime PRN  metoprolol tartrate 25 milliGRAM(s) Oral two times a day  midodrine 10 milliGRAM(s) Oral every 8 hours  morphine  - Injectable 2 milliGRAM(s) IV Push every 4 hours PRN  morphine  - Injectable 2 milliGRAM(s) IV Push every 6 hours PRN  senna 2 Tablet(s) Oral at bedtime  simethicone 80 milliGRAM(s) Chew daily        Assessment and Plan:  89M HTN, CKD, glaucoma, DLD, BPH presenting for worsening weakness and diarrhea. Found to have BOINTA on CKD with Cr of 6.6 with hyperkalemia of 6.3. He was also found to have evidence of metastatic disease on CT scan.    # Leukocytosis 2/2 Urinary source vs diarrhea vs malignancy induced  - will continue to monitor however patient family now seeking hospice care    # Highly suspicious metastatic cancer on Ct scan liver cirrhosis and large volume ascites + peritoneal carcinomatosis on CT scan  - patient will be evaluated by hospice as per families request    # BONITA on CKD Nonoliguric 2/2 pre renal.   - will continue current management and follow up with nephro  - will also discuss with family to determine care requested following hospice    # Hyperkalemia secondary to BONITA  - resolved    # HTN cont metoprolol  - holding meds for now  - controlled, however low    # DM - SSI    GI PPx - Protonix  DVT PPx Heparin 5000 mg SubQ   Activity OOB w/ assist  Dispo- will determine after hospice consult  DNR / DNI
CHIEF COMPLAINT:    Patient is a 89y old Male who presents with a chief complaint of weakness, diarrhea (03 Nov 2018 12:12)    Currently admitted to medicine with the primary diagnosis of Renal failure     Today is hospital day 5d. This morning he is resting comfortably in bed and reports no new issues or overnight events.     PAST MEDICAL & SURGICAL HISTORY  CKD (chronic kidney disease)  Dyslipidemia  Diabetes  HTN (hypertension)  History of colonoscopy with polypectomy    SOCIAL HISTORY:  Negative for smoking/alcohol/drug use.     ALLERGIES:  No Known Allergies    MEDICATIONS:  STANDING MEDICATIONS  ampicillin/sulbactam  IVPB      ampicillin/sulbactam  IVPB 1.5 Gram(s) IV Intermittent daily  dextrose 50% Injectable 12.5 Gram(s) IV Push once  dextrose 50% Injectable 25 Gram(s) IV Push once  dextrose 50% Injectable 25 Gram(s) IV Push once  dextrose 50% Injectable 50 milliLiter(s) IV Push once  docusate sodium 100 milliGRAM(s) Oral daily  dronabinol 2.5 milliGRAM(s) Oral two times a day  heparin  Injectable 5000 Unit(s) SubCutaneous every 8 hours  insulin glargine Injectable (LANTUS) 10 Unit(s) SubCutaneous at bedtime  insulin lispro (HumaLOG) corrective regimen sliding scale   SubCutaneous three times a day before meals  insulin lispro Injectable (HumaLOG) 4 Unit(s) SubCutaneous three times a day before meals  latanoprost 0.005% Ophthalmic Solution 1 Drop(s) Both EYES at bedtime  metoprolol tartrate 25 milliGRAM(s) Oral two times a day  midodrine 10 milliGRAM(s) Oral every 8 hours  senna 2 Tablet(s) Oral at bedtime  simethicone 80 milliGRAM(s) Chew daily    PRN MEDICATIONS  acetaminophen   Tablet .. 650 milliGRAM(s) Oral every 6 hours PRN  aluminum hydroxide/magnesium hydroxide/simethicone Suspension 30 milliLiter(s) Oral every 4 hours PRN  dextrose 40% Gel 15 Gram(s) Oral once PRN  glucagon  Injectable 1 milliGRAM(s) IntraMuscular once PRN  LORazepam     Tablet 1 milliGRAM(s) Oral at bedtime PRN  morphine  - Injectable 2 milliGRAM(s) IV Push every 4 hours PRN  morphine  - Injectable 2 milliGRAM(s) IV Push every 6 hours PRN    VITALS:   T(F): 97.8  HR: 84  BP: 99/57  RR: 16  SpO2: 93%    LABS:                        10.3   34.91 )-----------( 348      ( 02 Nov 2018 09:13 )             31.3     11-02    144  |  100  |  150<HH>  ----------------------------<  82  4.5   |  21  |  5.0<HH>    Ca    8.6      02 Nov 2018 09:13  Phos  6.8     11-02  Mg     2.7     11-02    PHYSICAL EXAM:  GENERAL:   lethargic but continues to awaken on command  HEENT:  NC/AT  NECK:   Supple.  CARDIO:   RRR; S1 & S2 audible  RESP:  No respiratory distress or accessory muscle use. CTAB  GI:   Distended tympanic non tender/ No guarding; No rebound tenderness.  EXT:   Without any cyanosis, clubbing, rash, lesions or edema.
DAILY PROGRESS NOTE  ===========================================================  Patient Information:   Hospital Day:</CHALO MOLINARY  /  89y  /  Male  /b> 2d /  MRN#: [id number]  Working / Admitting Diagnosis:  1. weakness fatigue secondary underlying malginancy    |:::::::::::::::::::::::::::::| SUBJECTIVE |:::::::::::::::::::::::::::::::|  OVERNIGHT EVENTS:   No acute events noted.  Denies chest pain / SOB / palpitations / Nausea / Vomitting / constipation / diarrhea or abdominal pain.   ROS otherwise negative.  Past Medical History:   RENAL FAILURE;SEPSIS;HYPERKALEMIA  Family history of HTN/ DM / CHF  CKD / DLD / DMII / HTN  History of colonoscopy with polypectomy    ALLERGIES:  No Known Allergies    HOME MEDICATIONS:  amlodipine-benazepril 5 mg-20 mg oral capsule: 1 cap(s) orally once a day (29 Oct 2018 19:43)  atorvastatin 10 mg oral tablet: 1 tab(s) orally once a day (29 Oct 2018 19:43)  glimepiride 4 mg oral tablet:  (29 Oct 2018 19:43)  Januvia 25 mg oral tablet: 1 tab(s) orally once a day (29 Oct 2018 19:43)  latanoprost 0.005% ophthalmic solution: 1 drop(s) to each affected eye once a day (in the evening) (29 Oct 2018 19:43)  Metoprolol Tartrate 25 mg oral tablet: 1 tab(s) orally 2 times a day (29 Oct 2018 19:43)  tamsulosin 0.4 mg oral capsule: 1 cap(s) orally once a day (29 Oct 2018 19:43)      |:::::::::::::::::::::::::::| OBJECTIVE |:::::::::::::::::::::::::::|    VITAL SIGNS: Last 24 Hours  T(C): 36.4 (31 Oct 2018 05:52), Max: 36.4 (31 Oct 2018 05:52)  T(F): 97.6 (31 Oct 2018 05:52), Max: 97.6 (31 Oct 2018 05:52)  HR: 89 (31 Oct 2018 13:18) (88 - 99)  BP: 106/55 (31 Oct 2018 13:18) (80/54 - 116/57)  RR: 18 (31 Oct 2018 05:52) (18 - 18)  SpO2: 95% (31 Oct 2018 06:05) (95% - 97%)    10-30-18 @ 07:01  -  10-31-18 @ 07:00  --------------------------------------------------------  IN: 0 mL / OUT: 650 mL / NET: -650 mL      PHYSICAL EXAM:  GENERAL:   lethargic but Awakens on command, alert; NAD.  HEENT:  Head NC/AT; Conjunctivae pale, Sclera anicteric & non-injected;  NECK:   Supple.  CARDIO:   RRR; S1 & S2 audible  RESP:  No respiratory distress or accessory muscle use. CTAB  GI:   Distended tympanic non tender/ No guarding; No rebound tenderness.  EXT:   Without any cyanosis, clubbing, rash, lesions or edema.     LAB RESULTS:                        11.2   34.50 )-----------( 422      ( 31 Oct 2018 10:27 )             33.5     10-31    143  |  100  |  148<HH>  ----------------------------<  151<H>  5.1<H>   |  19  |  5.2<HH>    Ca    8.7      31 Oct 2018 10:27  Phos  6.4     10-30  Mg     2.9     10-30  TPro  5.5<L>  /  Alb  2.3<L>  /  TBili  0.3  /  DBili  <0.2  /  AST  14  /  ALT  27  /  AlkPhos  88  10-30  PT/INR - ( 31 Oct 2018 10:27 )   PT: 15.60 sec;   INR: 1.36 ratio    PTT - ( 31 Oct 2018 10:27 )  PTT:29.5 sec  CARDIAC MARKERS ( 30 Oct 2018 00:40 )  x     / 0.04 ng/mL / x     / x     / 6.6 ng/mL      MICROBIOLOGY:  Culture - Blood (collected 30 Oct 2018 00:40)  Source: .Blood None  Preliminary Report (31 Oct 2018 13:02):    No growth to date.    RADIOLOGY:  < from: CT Abdomen and Pelvis No Cont (10.29.18 @ 14:38) >  IMPRESSION:  1.  Cirrhotic liver with large left hepatic lobe poorly defined lesion   and multiple nodular contour abnormalities consistent with underlying   masses. MRI dedicated to the liver with IV contrast can be performed for   further evaluation.  2.  Large volume abdominopelvic ascites with mesenteric nodularity   consistent with peritoneal carcinomatosis.  3.  Bilateral pulmonary nodules, largest measuring 1.6 cm in the right   lung, likely representing metastatic disease.  4.  Right-sided pleural effusion.  5. 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 This is better seen on the CT exam from one day earlier.   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 No change from CT   ABDOMEN AND PELVIS, CT CHEST with report dated 10/29/2018 4:08 PM.      Moderate abdominal pelvic ascites.    < end of copied text >  < from: US Kidney and Bladder (10.30.18 @ 12:48) >  IMPRESSION:  1.  No stones or hydronephrosis.    < end of copied text >      INPATIENT MEDICATIONS:  ampicillin/sulbactam  IVPB      ampicillin/sulbactam  IVPB 1.5 Gram(s) IV Intermittent daily  dextrose 50% Injectable 12.5 Gram(s) IV Push once  dextrose 50% Injectable 25 Gram(s) IV Push once  dextrose 50% Injectable 25 Gram(s) IV Push once  heparin  Injectable 5000 Unit(s) SubCutaneous every 8 hours  insulin glargine Injectable (LANTUS) 10 Unit(s) SubCutaneous at bedtime  insulin lispro (HumaLOG) corrective regimen sliding scale   SubCutaneous three times a day before meals  insulin lispro Injectable (HumaLOG) 4 Unit(s) SubCutaneous three times a day before meals  latanoprost 0.005% Ophthalmic Solution 1 Drop(s) Both EYES at bedtime  metoprolol tartrate 25 milliGRAM(s) Oral two times a day  sodium bicarbonate  Infusion 0.08 mEq/kG/Hr IV Continuous <Continuous>  tamsulosin 0.4 milliGRAM(s) Oral at bedtime    PRN MEDICATIONS  dextrose 40% Gel 15 Gram(s) Oral once PRN  glucagon  Injectable 1 milliGRAM(s) IntraMuscular once PRN    ---------------------------------------------------------------------------------
DAILY PROGRESS NOTE  ===========================================================  Patient Information:   Hospital Day:</CHALO MOLINARY  /  89y  /  Male  /b> 2d /  MRN#: [id number]  Working / Admitting Diagnosis:  1. weakness fatigue secondary underlying malginancy    |:::::::::::::::::::::::::::::| SUBJECTIVE |:::::::::::::::::::::::::::::::|  OVERNIGHT EVENTS:   No acute events noted.  Denies chest pain / SOB / palpitations / Nausea / Vomitting / constipation / diarrhea or abdominal pain.   ROS otherwise negative.  Past Medical History:   RENAL FAILURE;SEPSIS;HYPERKALEMIA  Family history of HTN/ DM / CHF  CKD / DLD / DMII / HTN  History of colonoscopy with polypectomy    ALLERGIES:  No Known Allergies    HOME MEDICATIONS:  amlodipine-benazepril 5 mg-20 mg oral capsule: 1 cap(s) orally once a day (29 Oct 2018 19:43)  atorvastatin 10 mg oral tablet: 1 tab(s) orally once a day (29 Oct 2018 19:43)  glimepiride 4 mg oral tablet:  (29 Oct 2018 19:43)  Januvia 25 mg oral tablet: 1 tab(s) orally once a day (29 Oct 2018 19:43)  latanoprost 0.005% ophthalmic solution: 1 drop(s) to each affected eye once a day (in the evening) (29 Oct 2018 19:43)  Metoprolol Tartrate 25 mg oral tablet: 1 tab(s) orally 2 times a day (29 Oct 2018 19:43)  tamsulosin 0.4 mg oral capsule: 1 cap(s) orally once a day (29 Oct 2018 19:43)      |:::::::::::::::::::::::::::| OBJECTIVE |:::::::::::::::::::::::::::|    VITAL SIGNS: Last 24 Hours  T(C): 36.4 (01 Nov 2018 05:11), Max: 36.4 (31 Oct 2018 20:25)  T(F): 97.6 (01 Nov 2018 05:11), Max: 97.6 (01 Nov 2018 05:11)  HR: 91 (01 Nov 2018 05:11) (87 - 94)  BP: 113/58 (01 Nov 2018 05:11) (93/50 - 120/61)  RR: 18 (01 Nov 2018 05:11) (18 - 18)  SpO2: 95% (31 Oct 2018 19:37) (95% - 95%)    PHYSICAL EXAM:  GENERAL:   lethargic but Awakens on command, alert; NAD.  HEENT:  Head NC/AT; Conjunctivae pale, Sclera anicteric & non-injected;  NECK:   Supple.  CARDIO:   RRR; S1 & S2 audible  RESP:  No respiratory distress or accessory muscle use. CTAB  GI:   Distended tympanic non tender/ No guarding; No rebound tenderness.  EXT:   Without any cyanosis, clubbing, rash, lesions or edema.     LAB RESULTS:                         11.2   34.50 )-----------( 422      ( 31 Oct 2018 10:27 )             33.5   10-31    143  |  100  |  148<HH>  ----------------------------<  151<H>  5.1<H>   |  19  |  5.2<HH>    Ca    8.7      31 Oct 2018 10:27    TPro  5.5<L>  /  Alb  2.3<L>  /  TBili  0.3  /  DBili  <0.2  /  AST  14  /  ALT  27  /  AlkPhos  88  10-30    MICROBIOLOGY:  Culture - Body Fluid with Gram Stain (10.31.18 @ 11:50)    Gram Stain:   polymorphonuclear leukocytes seen  No organisms seen  by cytocentrifuge    Specimen Source: Paracentesis None    IMPRESSION:  Culture - Blood (10.30.18 @ 00:40)    Specimen Source: .Blood None    Culture Results:   No growth to date.    RADIOLOGY:  < from: CT Abdomen and Pelvis No Cont (10.29.18 @ 14:38) >  1.  Cirrhotic liver with large left hepatic lobe poorly defined lesion   and multiple nodular contour abnormalities consistent with underlying   masses. MRI dedicated to the liver with IV contrast can be performed for   further evaluation.  2.  Large volume abdominopelvic ascites with mesenteric nodularity   consistent with peritoneal carcinomatosis.  3.  Bilateral pulmonary nodules, largest measuring 1.6 cm in the right   lung, likely representing metastatic disease.  4.  Right-sided pleural effusion.  5. 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 This is better seen on the CT exam from one day earlier.   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 No change from CT   ABDOMEN AND PELVIS, CT CHEST with report dated 10/29/2018 4:08 PM.    Moderate abdominal pelvic ascites.    < end of copied text >  < from: US Kidney and Bladder (10.30.18 @ 12:48) >  IMPRESSION:  1.  No stones or hydronephrosis.    < end of copied text >      INPATIENT MEDICATIONS:  MEDICATIONS  (STANDING):  ampicillin/sulbactam  IVPB      ampicillin/sulbactam  IVPB 1.5 Gram(s) IV Intermittent daily  dextrose 50% Injectable 12.5 Gram(s) IV Push once  dextrose 50% Injectable 25 Gram(s) IV Push once  dextrose 50% Injectable 25 Gram(s) IV Push once  heparin  Injectable 5000 Unit(s) SubCutaneous every 8 hours  insulin glargine Injectable (LANTUS) 10 Unit(s) SubCutaneous at bedtime  insulin lispro (HumaLOG) corrective regimen sliding scale   SubCutaneous three times a day before meals  insulin lispro Injectable (HumaLOG) 4 Unit(s) SubCutaneous three times a day before meals  latanoprost 0.005% Ophthalmic Solution 1 Drop(s) Both EYES at bedtime  metoprolol tartrate 25 milliGRAM(s) Oral two times a day  sodium bicarbonate  Infusion 0.08 mEq/kG/Hr (75 mL/Hr) IV Continuous <Continuous>  tamsulosin 0.4 milliGRAM(s) Oral at bedtime    MEDICATIONS  (PRN):  acetaminophen   Tablet .. 650 milliGRAM(s) Oral every 6 hours PRN Mild Pain (1 - 3)  dextrose 40% Gel 15 Gram(s) Oral once PRN Blood Glucose LESS THAN 70 milliGRAM(s)/deciliter  glucagon  Injectable 1 milliGRAM(s) IntraMuscular once PRN Glucose LESS THAN 70 milligrams/deciliter  morphine  - Injectable 2 milliGRAM(s) IV Push every 6 hours PRN Severe Pain (7 - 10)
Patient is a 89y old  Male who presents with a chief complaint of weakness, diarrhea (30 Oct 2018 07:37)      Overnight events: none. patient still feels tired and has no appetite.    PAST MEDICAL & SURGICAL HISTORY:  CKD (chronic kidney disease)  Dyslipidemia  Diabetes  HTN (hypertension)  History of colonoscopy with polypectomy      MEDICATIONS  (STANDING):  ampicillin/sulbactam  IVPB      dextrose 50% Injectable 12.5 Gram(s) IV Push once  dextrose 50% Injectable 25 Gram(s) IV Push once  dextrose 50% Injectable 25 Gram(s) IV Push once  heparin  Injectable 5000 Unit(s) SubCutaneous every 8 hours  insulin glargine Injectable (LANTUS) 10 Unit(s) SubCutaneous at bedtime  insulin lispro (HumaLOG) corrective regimen sliding scale   SubCutaneous three times a day before meals  insulin lispro Injectable (HumaLOG) 4 Unit(s) SubCutaneous three times a day before meals  lactated ringers Bolus 500 milliLiter(s) IV Bolus once  latanoprost 0.005% Ophthalmic Solution 1 Drop(s) Both EYES at bedtime  metoprolol tartrate 25 milliGRAM(s) Oral two times a day  sodium bicarbonate  Infusion 0.08 mEq/kG/Hr (75 mL/Hr) IV Continuous <Continuous>  tamsulosin 0.4 milliGRAM(s) Oral at bedtime    MEDICATIONS  (PRN):  dextrose 40% Gel 15 Gram(s) Oral once PRN Blood Glucose LESS THAN 70 milliGRAM(s)/deciliter  glucagon  Injectable 1 milliGRAM(s) IntraMuscular once PRN Glucose LESS THAN 70 milligrams/deciliter      Home medications  amlodipine-benazepril 5 mg-20 mg oral capsule: 1 cap(s) orally once a day  atorvastatin 10 mg oral tablet: 1 tab(s) orally once a day  glimepiride 4 mg oral tablet:   Januvia 25 mg oral tablet: 1 tab(s) orally once a day  latanoprost 0.005% ophthalmic solution: 1 drop(s) to each affected eye once a day (in the evening)  Metoprolol Tartrate 25 mg oral tablet: 1 tab(s) orally 2 times a day  tamsulosin 0.4 mg oral capsule: 1 cap(s) orally once a day      Vital Signs Last 24 Hrs  T(C): 34.4 (30 Oct 2018 08:03), Max: 36.9 (29 Oct 2018 20:00)  T(F): 94 (30 Oct 2018 08:03), Max: 98.5 (30 Oct 2018 06:22)  HR: 88 (30 Oct 2018 10:00) (83 - 118)  BP: 111/56 (30 Oct 2018 10:00) (101/60 - 132/66)  BP(mean): --  RR: 18 (30 Oct 2018 10:00) (18 - 19)  SpO2: 98% (30 Oct 2018 10:00) (96% - 100%)  CAPILLARY BLOOD GLUCOSE      POCT Blood Glucose.: 190 mg/dL (30 Oct 2018 07:26)  POCT Blood Glucose.: 242 mg/dL (30 Oct 2018 03:31)  POCT Blood Glucose.: 272 mg/dL (29 Oct 2018 21:28)  POCT Blood Glucose.: 342 mg/dL (29 Oct 2018 15:43)    I&O's Summary    29 Oct 2018 07:01  -  30 Oct 2018 07:00  --------------------------------------------------------  IN: 350 mL / OUT: 800 mL / NET: -450 mL        Physical Exam:    	GENERAL: NAD, looks chronically ill  	HEAD:  Atraumatic, Normocephalic  	EYES: EOMI, PERRLA, conjunctiva and sclera clear  	NECK: Supple, No JVD  	CHEST/LUNG: bilateral breath sounds, R basal crackles  	HEART: Regular rate and rhythm; No murmurs, rubs, or gallops  	ABDOMEN: Soft, Nontender, distended; Bowel sounds present  	EXTREMITIES:  2+ Peripheral Pulses, No clubbing, cyanosis, or edema  	PSYCH: AAOx3  	NEUROLOGY: non-focal  SKIN: No rashes or lesions      Labs:                        10.8   .30 )-----------( 420      ( 30 Oct 2018 08:57 )             32.6             10-30    140  |  101  |  146<HH>  ----------------------------<  204<H>  5.1<H>   |  18  |  5.5<HH>    Ca    8.9      30 Oct 2018 08:57  Phos  6.4     10-30  Mg     2.9     10-30    TPro  5.6<L>  /  Alb  2.3<L>  /  TBili  0.4  /  DBili  0.2  /  AST  13  /  ALT  28  /  AlkPhos  90  10-30    LIVER FUNCTIONS - ( 30 Oct 2018 08:57 )  Alb: 2.3 g/dL / Pro: 5.6 g/dL / ALK PHOS: 90 U/L / ALT: 28 U/L / AST: 13 U/L / GGT: x                 PT/INR - ( 29 Oct 2018 12:25 )   PT: 14.20 sec;   INR: 1.24 ratio         PTT - ( 29 Oct 2018 12:25 )  PTT:26.1 sec  CARDIAC MARKERS ( 30 Oct 2018 00:40 )  x     / 0.04 ng/mL / x     / x     / 6.6 ng/mL  CARDIAC MARKERS ( 29 Oct 2018 13:40 )  x     / x     / 131 U/L / x     / x      CARDIAC MARKERS ( 29 Oct 2018 12:25 )  x     / 0.05 ng/mL / x     / x     / x            Urinalysis Basic - ( 29 Oct 2018 13:20 )    Color: Yellow / Appearance: Clear / S.015 / pH: x  Gluc: x / Ketone: Negative  / Bili: Negative / Urobili: 0.2 mg/dL   Blood: x / Protein: Trace mg/dL / Nitrite: Negative   Leuk Esterase: Moderate / RBC: 1-2 /HPF / WBC 10-25 /HPF   Sq Epi: x / Non Sq Epi: x / Bacteria: Few /HPF        Labs, Radiology, Cardiology, and Other Results: 11.4   26.93 )-----------( 469      ( 29 Oct 2018 12:25 )             34.2     10-   131<L>  |  96<L>  |  149<HH>  ----------------------------<  352<H>  5.3<H>   |  15<L>  |  6.1<HH>   Ca    8.8      29 Oct 2018 16:00   TPro  6.0  /  Alb  2.4<L>  /  TBili  0.4  /  DBili  x   /  AST  13  /  ALT  29  /  AlkPhos  96  10-    PT/INR - ( 29 Oct 2018 12:25 )   PT: 14.20 sec;   INR: 1.24 ratio        PTT - ( 29 Oct 2018 12:25 )  PTT:26.1 sec   Blood Gas Venous - Lactate (10.29.18 @ 14:45)    Blood Gas Venous - Lactate: 2.6 mmoL/L   Troponin T, Serum (10.29.18 @ 12:25)    Troponin T, Serum: 0.05: Critical value: ng/mL  Lipase, Serum (10.29.18 @ 12:25)    Lipase, Serum: 28 U/L   Urinalysis (10.29.18 @ 13:20)    Glucose Qualitative, Urine: Negative mg/dL    Blood, Urine: Trace    pH Urine: 5.5    Color: Yellow    Urine Appearance: Clear    Bilirubin: Negative    Ketone - Urine: Negative    Specific Gravity: 1.015    Protein, Urine: Trace mg/dL    Urobilinogen: 0.2 mg/dL    Nitrite: Negative    Leukocyte Esterase Concentration: Moderate   Blood Gas Profile - Venous (10.29.18 @ 14:45)    pH, Venous: 7.27    pCO2, Venous: 37 mmHg    pO2, Venous: 22 mmHg    HCO3, Venous: 17 mmoL/L    Base Excess, Venous: -9.1 mmoL/L    Oxygen Saturation, Venous: 30 %    < from: CT Chest/ abd pelvis No Cont (10.29.18 @ 14:38) >    IMPRESSION:  1.  Cirrhotic liver with large left hepatic lobe poorly defined lesion   and multiple nodular contour abnormalities consistent with underlying   masses. MRI dedicated to the liver with IV contrast can be performed for   further evaluation.  2.  Large volume abdominopelvic ascites with mesenteric nodularity   consistent with peritoneal carcinomatosis.  3.  Bilateral pulmonary nodules, largest measuring 1.6 cm in the right   lung, likely representing metastatic disease.  4.  Right-sided pleural effusion.  5. 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  No evidence of acute intracranial pathology.     2.  Moderate-severe chronic microvascular changes.   < end of copied text >   < from: Xray Chest 1 View-PORTABLE IMMEDIATE (10.29.18 @ 13:29) >  Right basilar opacification.  Follow-up as needed.  < end of copied text >   < from: 12 Lead ECG (10.29.18 @ 12:42) >  Ventricular Rate 106 BPM  Atrial Rate 106 BPM  P-R Interval 150 ms  QRS Duration 78 ms  Q-T Interval 330 ms  QTC Calculation(Bezet) 438 ms  P Axis 51 degrees  R Axis -9 degrees  T Axis 66 degrees  Diagnosis Line Sinus tachycardia  Poor R wave progression  Left axis deviation  Otherwise normal ECG  Confirmed by BHAVANA ESTEBAN, MARK ([id number]) on 10/29/2018 7:22:39 PM < end of copied text >
[place] NEPHROLOGY FOLLOW UP NOTE  --------------------------------------------------------------------------------  24 hour events/subjective: Patient examined. Appears comfortable.    PAST HISTORY  --------------------------------------------------------------------------------  No significant changes to PMH, PSH, FHx, SHx, unless otherwise noted    ALLERGIES & MEDICATIONS  --------------------------------------------------------------------------------  Allergies    No Known Allergies    Standing Inpatient Medications  ampicillin/sulbactam  IVPB      ampicillin/sulbactam  IVPB 1.5 Gram(s) IV Intermittent daily  dextrose 50% Injectable 12.5 Gram(s) IV Push once  dextrose 50% Injectable 25 Gram(s) IV Push once  dextrose 50% Injectable 25 Gram(s) IV Push once  dronabinol 2.5 milliGRAM(s) Oral two times a day  heparin  Injectable 5000 Unit(s) SubCutaneous every 8 hours  insulin glargine Injectable (LANTUS) 10 Unit(s) SubCutaneous at bedtime  insulin lispro (HumaLOG) corrective regimen sliding scale   SubCutaneous three times a day before meals  insulin lispro Injectable (HumaLOG) 4 Unit(s) SubCutaneous three times a day before meals  latanoprost 0.005% Ophthalmic Solution 1 Drop(s) Both EYES at bedtime  metoprolol tartrate 25 milliGRAM(s) Oral two times a day  sodium bicarbonate  Infusion 0.08 mEq/kG/Hr IV Continuous <Continuous>  tamsulosin 0.4 milliGRAM(s) Oral at bedtime    PRN Inpatient Medications  acetaminophen   Tablet .. 650 milliGRAM(s) Oral every 6 hours PRN  dextrose 40% Gel 15 Gram(s) Oral once PRN  glucagon  Injectable 1 milliGRAM(s) IntraMuscular once PRN  morphine  - Injectable 2 milliGRAM(s) IV Push every 6 hours PRN    VITALS/PHYSICAL EXAM  --------------------------------------------------------------------------------  T(C): 36.4 (11-01-18 @ 05:11), Max: 36.4 (10-31-18 @ 20:25)  HR: 91 (11-01-18 @ 05:11) (87 - 94)  BP: 113/58 (11-01-18 @ 05:11) (93/50 - 120/61)  RR: 18 (11-01-18 @ 05:11) (18 - 18)  SpO2: 95% (10-31-18 @ 19:37) (95% - 95%)  Height (cm): 170.18 (10-30-18 @ 15:46)  Weight (kg): 69.5 (11-01-18 @ 06:40)  BMI (kg/m2): 24 (11-01-18 @ 06:40)  BSA (m2): 1.81 (11-01-18 @ 06:40)    10-31-18 @ 07:01  -  11-01-18 @ 07:00  --------------------------------------------------------  IN: 0 mL / OUT: 1075 mL / NET: -1075 mL    Physical Exam:  	Gen: NAD  	Pulm: CTA B/L  	CV: RRR, S1S2  	Abd: +BS, soft, nontender/distended  	: No suprapubic tenderness  	LE: Warm,no edema    LABS/STUDIES  --------------------------------------------------------------------------------              11.2   34.50 >-----------<  422      [10-31-18 @ 10:27]              33.5     143  |  100  |  148  ----------------------------<  151      [10-31-18 @ 10:27]  5.1   |  19  |  5.2        Ca     8.7     [10-31-18 @ 10:27]    TPro  5.5  /  Alb  2.3  /  TBili  0.3  /  DBili  <0.2  /  AST  14  /  ALT  27  /  AlkPhos  88  [10-30-18 @ 17:41]    PT/INR: PT 15.60, INR 1.36       [10-31-18 @ 10:27]  PTT: 29.5       [10-31-18 @ 10:27]    Creatinine Trend:  SCr 5.2 [10-31 @ 10:27]  SCr 4.7 [10-31 @ 02:06]  SCr 5.1 [10-30 @ 17:41]  SCr 5.5 [10-30 @ 08:57]  SCr 5.7 [10-30 @ 00:40]    Urinalysis - [10-29-18 @ 13:20]      Color Yellow / Appearance Clear / SG 1.015 / pH 5.5      Gluc Negative / Ketone Negative  / Bili Negative / Urobili 0.2       Blood Trace / Protein Trace / Leuk Est Moderate / Nitrite Negative      RBC 1-2 / WBC 10-25 / Hyaline  / Gran  / Sq Epi  / Non Sq Epi  / Bacteria Few    HbA1c 7.1      [10-30-18 @ 08:57]    HBsAb Nonreact      [10-30-18 @ 17:41]  HBsAg Nonreact      [10-30-18 @ 17:41]  HCV 0.09, Nonreact      [10-30-18 @ 17:41]
[facility] NEPHROLOGY FOLLOW UP NOTE  --------------------------------------------------------------------------------  spoke with family at bedside  off bicarb gtt  poor po intake  c/o abd distention    PAST HISTORY  --------------------------------------------------------------------------------  No significant changes to PMH, PSH, FHx, SHx, unless otherwise noted    ALLERGIES & MEDICATIONS  --------------------------------------------------------------------------------  Allergies    No Known Allergies      Physical Exam:  	Gen: NAD  	Pulm: CTA B/L  	CV: RRR, S1S2  	Abd: +BS, soft, nontender/distended  	: No suprapubic tenderness  	LE: Warm,no edema    Hospital Medications:   MEDICATIONS  (STANDING):  ampicillin/sulbactam  IVPB      ampicillin/sulbactam  IVPB 1.5 Gram(s) IV Intermittent daily  dextrose 50% Injectable 12.5 Gram(s) IV Push once  dextrose 50% Injectable 25 Gram(s) IV Push once  dextrose 50% Injectable 25 Gram(s) IV Push once  dronabinol 2.5 milliGRAM(s) Oral two times a day  heparin  Injectable 5000 Unit(s) SubCutaneous every 8 hours  insulin glargine Injectable (LANTUS) 10 Unit(s) SubCutaneous at bedtime  insulin lispro (HumaLOG) corrective regimen sliding scale   SubCutaneous three times a day before meals  insulin lispro Injectable (HumaLOG) 4 Unit(s) SubCutaneous three times a day before meals  latanoprost 0.005% Ophthalmic Solution 1 Drop(s) Both EYES at bedtime  metoprolol tartrate 25 milliGRAM(s) Oral two times a day  midodrine 10 milliGRAM(s) Oral every 8 hours  simethicone 80 milliGRAM(s) Chew daily  tamsulosin 0.4 milliGRAM(s) Oral at bedtime        VITALS:  T(F): 97.1 (18 @ 05:35), Max: 97.1 (18 @ 05:35)  HR: 98 (18 @ 05:35)  BP: 102/55 (18 @ 05:35)  RR: 18 (18 @ 05:35)  SpO2: 94% (18 @ 19:30)  Wt(kg): --    10-31 @ 07:  -   @ 07:00  --------------------------------------------------------  IN: 0 mL / OUT: 1075 mL / NET: -1075 mL     @ 07:  -   @ 07:00  --------------------------------------------------------  IN: 0 mL / OUT: 600 mL / NET: -600 mL     @ 07:01  -   @ 11:00  --------------------------------------------------------  IN: 120 mL / OUT: 0 mL / NET: 120 mL          LABS:      144  |  100  |  150<HH>  ----------------------------<  82  4.5   |  21  |  5.0<HH>    Ca    8.6      2018 09:13  Phos  6.8     11-  Mg     2.7     11-    TPro  4.9<L>  /  Alb  2.2<L>  /  TBili  0.4  /  DBili  0.2  /  AST  16  /  ALT  24  /  AlkPhos  84  11-                          10.3   34.91 )-----------( 348      ( 2018 09:13 )             31.3       Urine Studies:  Urinalysis Basic - ( 29 Oct 2018 13:20 )    Color: Yellow / Appearance: Clear / S.015 / pH:   Gluc:  / Ketone: Negative  / Bili: Negative / Urobili: 0.2 mg/dL   Blood:  / Protein: Trace mg/dL / Nitrite: Negative   Leuk Esterase: Moderate / RBC: 1-2 /HPF / WBC 10-25 /HPF   Sq Epi:  / Non Sq Epi:  / Bacteria: Few /HPF          RADIOLOGY & ADDITIONAL STUDIES:
[place] NEPHROLOGY FOLLOW UP NOTE  --------------------------------------------------------------------------------  spoke with family at bedside in detail again  pt c/o abd / back pain and abd distention  poor uop  not eating much    PAST HISTORY  --------------------------------------------------------------------------------  No significant changes to PMH, PSH, FHx, SHx, unless otherwise noted    ALLERGIES & MEDICATIONS  --------------------------------------------------------------------------------  Allergies    No Known Allergies      Physical Exam:  	Gen: NAD  	Pulm: CTA B/L  	CV: RRR, S1S2  	Abd: +BS, soft, nontender/distended  	: No suprapubic tenderness  	LE: Warm,no edema    Hospital Medications:   MEDICATIONS  (STANDING):  ampicillin/sulbactam  IVPB      ampicillin/sulbactam  IVPB 1.5 Gram(s) IV Intermittent daily  dextrose 50% Injectable 12.5 Gram(s) IV Push once  dextrose 50% Injectable 25 Gram(s) IV Push once  dextrose 50% Injectable 25 Gram(s) IV Push once  docusate sodium 100 milliGRAM(s) Oral daily  dronabinol 2.5 milliGRAM(s) Oral two times a day  heparin  Injectable 5000 Unit(s) SubCutaneous every 8 hours  insulin glargine Injectable (LANTUS) 10 Unit(s) SubCutaneous at bedtime  insulin lispro (HumaLOG) corrective regimen sliding scale   SubCutaneous three times a day before meals  insulin lispro Injectable (HumaLOG) 4 Unit(s) SubCutaneous three times a day before meals  latanoprost 0.005% Ophthalmic Solution 1 Drop(s) Both EYES at bedtime  metoprolol tartrate 25 milliGRAM(s) Oral two times a day  midodrine 10 milliGRAM(s) Oral every 8 hours  senna 2 Tablet(s) Oral at bedtime  simethicone 80 milliGRAM(s) Chew daily        VITALS:  T(F): 96.4 (18 @ 13:16), Max: 97.8 (18 @ 05:15)  HR: 94 (18 @ 13:16)  BP: 111/56 (18 @ 13:16)  RR: 15 (18 @ 13:16)  SpO2: 93% (18 @ 09:08)  Wt(kg): --     @ 07:  -   @ 07:00  --------------------------------------------------------  IN: 0 mL / OUT: 600 mL / NET: -600 mL     @ 07:01  -   @ 07:00  --------------------------------------------------------  IN: 120 mL / OUT: 300 mL / NET: -180 mL     @ 08:01  -   @ 15:00  --------------------------------------------------------  IN: 120 mL / OUT: 0 mL / NET: 120 mL          LABS:      144  |  100  |  150<HH>  ----------------------------<  82  4.5   |  21  |  5.0<HH>    Ca    8.6      2018 09:13  Phos  6.8     11-02  Mg     2.7     11-                            10.3   34.91 )-----------( 348      ( 2018 09:13 )             31.3       Urine Studies:  Urinalysis Basic - ( 29 Oct 2018 13:20 )    Color: Yellow / Appearance: Clear / S.015 / pH:   Gluc:  / Ketone: Negative  / Bili: Negative / Urobili: 0.2 mg/dL   Blood:  / Protein: Trace mg/dL / Nitrite: Negative   Leuk Esterase: Moderate / RBC: 1-2 /HPF / WBC 10-25 /HPF   Sq Epi:  / Non Sq Epi:  / Bacteria: Few /HPF          RADIOLOGY & ADDITIONAL STUDIES:
SUBJECTIVE:    Patient is a 89y old Male who presents with a chief complaint of weakness, diarrhea (02 Nov 2018 10:59)    Currently admitted to medicine with the primary diagnosis of Renal failure     Today is hospital day 4d. This morning he is resting comfortably in bed and reports no new issues or overnight events.     PAST MEDICAL & SURGICAL HISTORY  CKD (chronic kidney disease)  Dyslipidemia  Diabetes  HTN (hypertension)  History of colonoscopy with polypectomy    SOCIAL HISTORY:  Negative for smoking/alcohol/drug use.     ALLERGIES:  No Known Allergies    MEDICATIONS:  STANDING MEDICATIONS  ampicillin/sulbactam  IVPB      ampicillin/sulbactam  IVPB 1.5 Gram(s) IV Intermittent daily  dextrose 50% Injectable 12.5 Gram(s) IV Push once  dextrose 50% Injectable 25 Gram(s) IV Push once  dextrose 50% Injectable 25 Gram(s) IV Push once  dronabinol 2.5 milliGRAM(s) Oral two times a day  heparin  Injectable 5000 Unit(s) SubCutaneous every 8 hours  insulin glargine Injectable (LANTUS) 10 Unit(s) SubCutaneous at bedtime  insulin lispro (HumaLOG) corrective regimen sliding scale   SubCutaneous three times a day before meals  insulin lispro Injectable (HumaLOG) 4 Unit(s) SubCutaneous three times a day before meals  latanoprost 0.005% Ophthalmic Solution 1 Drop(s) Both EYES at bedtime  metoprolol tartrate 25 milliGRAM(s) Oral two times a day  midodrine 10 milliGRAM(s) Oral every 8 hours  simethicone 80 milliGRAM(s) Chew daily    PRN MEDICATIONS  acetaminophen   Tablet .. 650 milliGRAM(s) Oral every 6 hours PRN  aluminum hydroxide/magnesium hydroxide/simethicone Suspension 30 milliLiter(s) Oral every 4 hours PRN  dextrose 40% Gel 15 Gram(s) Oral once PRN  glucagon  Injectable 1 milliGRAM(s) IntraMuscular once PRN  morphine  - Injectable 2 milliGRAM(s) IV Push every 6 hours PRN    VITALS:   T(F): 97.1  HR: 98  BP: 102/55  RR: 18  SpO2: 94%    LABS:                        10.3   34.91 )-----------( 348      ( 02 Nov 2018 09:13 )             31.3     11-02    144  |  100  |  150<HH>  ----------------------------<  82  4.5   |  21  |  5.0<HH>    Ca    8.6      02 Nov 2018 09:13  Phos  6.8     11-02  Mg     2.7     11-02    TPro  4.9<L>  /  Alb  2.2<L>  /  TBili  0.4  /  DBili  0.2  /  AST  16  /  ALT  24  /  AlkPhos  84  11-01    Culture - Body Fluid with Gram Stain (collected 31 Oct 2018 11:50)  Source: Paracentesis None  Gram Stain (31 Oct 2018 22:46):    polymorphonuclear leukocytes seen    No organisms seen    by cytocentrifuge  Preliminary Report (01 Nov 2018 16:15):    No growth to date.    PHYSICAL EXAM:  GENERAL:   lethargic but continues to awaken on command  HEENT:  NC/AT  NECK:   Supple.  CARDIO:   RRR; S1 & S2 audible  RESP:  No respiratory distress or accessory muscle use. CTAB  GI:   Distended tympanic non tender/ No guarding; No rebound tenderness.  EXT:   Without any cyanosis, clubbing, rash, lesions or edema.
[place] NEPHROLOGY FOLLOW UP NOTE  --------------------------------------------------------------------------------  24 hour events/subjective: Patient examined. Appears comfortable.    PAST HISTORY  --------------------------------------------------------------------------------  No significant changes to PMH, PSH, FHx, SHx, unless otherwise noted    ALLERGIES & MEDICATIONS  --------------------------------------------------------------------------------  Allergies    No Known Allergies    Standing Inpatient Medications  albumin human  5% IVPB 500 milliLiter(s) IV Intermittent once  ampicillin/sulbactam  IVPB      ampicillin/sulbactam  IVPB 1.5 Gram(s) IV Intermittent daily  dextrose 50% Injectable 12.5 Gram(s) IV Push once  dextrose 50% Injectable 25 Gram(s) IV Push once  dextrose 50% Injectable 25 Gram(s) IV Push once  heparin  Injectable 5000 Unit(s) SubCutaneous every 8 hours  insulin glargine Injectable (LANTUS) 10 Unit(s) SubCutaneous at bedtime  insulin lispro (HumaLOG) corrective regimen sliding scale   SubCutaneous three times a day before meals  insulin lispro Injectable (HumaLOG) 4 Unit(s) SubCutaneous three times a day before meals  latanoprost 0.005% Ophthalmic Solution 1 Drop(s) Both EYES at bedtime  metoprolol tartrate 25 milliGRAM(s) Oral two times a day  sodium bicarbonate  Infusion 0.08 mEq/kG/Hr IV Continuous <Continuous>  tamsulosin 0.4 milliGRAM(s) Oral at bedtime    PRN Inpatient Medications  dextrose 40% Gel 15 Gram(s) Oral once PRN  glucagon  Injectable 1 milliGRAM(s) IntraMuscular once PRN    VITALS/PHYSICAL EXAM  --------------------------------------------------------------------------------  T(C): 36.4 (10-31-18 @ 05:52), Max: 36.4 (10-31-18 @ 05:52)  HR: 91 (10-31-18 @ 11:58) (88 - 99)  BP: 93/50 (10-31-18 @ 11:58) (80/54 - 116/57)  RR: 18 (10-31-18 @ 05:52) (18 - 18)  SpO2: 95% (10-31-18 @ 06:05) (95% - 97%)  Height (cm): 170.18 (10-30-18 @ 15:46)  Weight (kg): 68.9 (10-31-18 @ 06:51)  BMI (kg/m2): 23.8 (10-31-18 @ 06:51)  BSA (m2): 1.8 (10-31-18 @ 06:51)    10-30-18 @ 07:01  -  10-31-18 @ 07:00  --------------------------------------------------------  IN: 0 mL / OUT: 650 mL / NET: -650 mL    Physical Exam:  	Gen: NAD  	Pulm: CTA B/L  	CV: RRR, S1S2  	Abd: +BS, nondistended  	: No suprapubic tenderness  	LE: Warm, no edema    LABS/STUDIES  --------------------------------------------------------------------------------              11.2   34.50 >-----------<  422      [10-31-18 @ 10:27]              33.5     143  |  100  |  148  ----------------------------<  151      [10-31-18 @ 10:27]  5.1   |  19  |  5.2        Ca     8.7     [10-31-18 @ 10:27]      Mg     2.9     [10-30-18 @ 08:57]      Phos  6.4     [10-30-18 @ 08:57]    TPro  5.5  /  Alb  2.3  /  TBili  0.3  /  DBili  <0.2  /  AST  14  /  ALT  27  /  AlkPhos  88  [10-30-18 @ 17:41]    PT/INR: PT 15.60, INR 1.36       [10-31-18 @ 10:27]  PTT: 29.5       [10-31-18 @ 10:27]    Troponin 0.04      [10-30-18 @ 00:40]        [10-29-18 @ 13:40]    Creatinine Trend:  SCr 5.2 [10-31 @ 10:27]  SCr 4.7 [10-31 @ 02:06]  SCr 5.1 [10-30 @ 17:41]  SCr 5.5 [10-30 @ 08:57]  SCr 5.7 [10-30 @ 00:40]    Urinalysis - [10-29-18 @ 13:20]      Color Yellow / Appearance Clear / SG 1.015 / pH 5.5      Gluc Negative / Ketone Negative  / Bili Negative / Urobili 0.2       Blood Trace / Protein Trace / Leuk Est Moderate / Nitrite Negative      RBC 1-2 / WBC 10-25 / Hyaline  / Gran  / Sq Epi  / Non Sq Epi  / Bacteria Few    HbA1c 7.1      [10-30-18 @ 08:57]    HBsAg Nonreact      [10-30-18 @ 17:41]  HCV 0.09, Nonreact      [10-30-18 @ 17:41]

## 2018-11-03 NOTE — PROGRESS NOTE ADULT - PROVIDER SPECIALTY LIST ADULT
Internal Medicine
Nephrology

## 2018-11-03 NOTE — PROGRESS NOTE ADULT - REASON FOR ADMISSION
weakness, diarrhea

## 2018-11-03 NOTE — CHART NOTE - NSCHARTNOTEFT_GEN_A_CORE
Discussion with family at bedside to transition to comfort care. Will dc all labs, standing and PRN morphine and Ativan ordered, glycopyrrolate    discussion had with Kamala BUSBY and MOLST updated. Called by RN to evaluate patient. At this time he seems to be more tachypneic, in no acute distress and denies any pain or discomfort. Placed on Nasal canula and o requirement increasing. Patient was awaiting Hospice placement on Monday, however discussion was had with family at bedside and they wish to transition to comfort care measures only. MOLST form updated and placed in chart.     DC all ABX and all labs and finger sticks  Standing and PRN morphine   Ativan for anxiety and tachypnea  Glycopyrrolate for secretions    Family kindly requested to remain in same room for now. Will plan to accommodate.     Discussion had with Kmaala and her daughter who are both listed as HCP and MOLST updated.

## 2018-11-04 LAB
CULTURE RESULTS: SIGNIFICANT CHANGE UP
SPECIMEN SOURCE: SIGNIFICANT CHANGE UP

## 2018-11-05 LAB
CULTURE RESULTS: NO GROWTH — SIGNIFICANT CHANGE UP
SPECIMEN SOURCE: SIGNIFICANT CHANGE UP

## 2022-10-23 NOTE — PATIENT PROFILE ADULT - NSPROGENARRIVEDFROM_GEN_A_NUR
emergency department
no loss of consciousness, no gait abnormality, no headache, no sensory deficits, and no weakness.
